# Patient Record
Sex: MALE | Race: WHITE | Employment: FULL TIME | URBAN - NONMETROPOLITAN AREA
[De-identification: names, ages, dates, MRNs, and addresses within clinical notes are randomized per-mention and may not be internally consistent; named-entity substitution may affect disease eponyms.]

---
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
## 2021-08-03 ENCOUNTER — HOSPITAL ENCOUNTER (OUTPATIENT)
Dept: PSYCHIATRY | Age: 21
Setting detail: THERAPIES SERIES
Discharge: HOME | End: 2021-08-03
Payer: COMMERCIAL

## 2021-08-03 PROCEDURE — 90791 PSYCH DIAGNOSTIC EVALUATION: CPT

## 2021-08-03 PROCEDURE — 80305 DRUG TEST PRSMV DIR OPT OBS: CPT

## 2021-08-03 ASSESSMENT — ANXIETY QUESTIONNAIRES
IF YOU CHECKED OFF ANY PROBLEMS ON THIS QUESTIONNAIRE, HOW DIFFICULT HAVE THESE PROBLEMS MADE IT FOR YOU TO DO YOUR WORK, TAKE CARE OF THINGS AT HOME, OR GET ALONG WITH OTHER PEOPLE: SOMEWHAT DIFFICULT
2. NOT BEING ABLE TO STOP OR CONTROL WORRYING: 1
1. FEELING NERVOUS, ANXIOUS, OR ON EDGE: 1
4. TROUBLE RELAXING: 0
5. BEING SO RESTLESS THAT IT IS HARD TO SIT STILL: 0
7. FEELING AFRAID AS IF SOMETHING AWFUL MIGHT HAPPEN: 1
6. BECOMING EASILY ANNOYED OR IRRITABLE: 2
GAD7 TOTAL SCORE: 6
3. WORRYING TOO MUCH ABOUT DIFFERENT THINGS: 1

## 2021-08-03 ASSESSMENT — LIFESTYLE VARIABLES: HISTORY_ALCOHOL_USE: NO

## 2021-08-03 ASSESSMENT — PATIENT HEALTH QUESTIONNAIRE - PHQ9: SUM OF ALL RESPONSES TO PHQ QUESTIONS 1-9: 6

## 2021-08-03 NOTE — PROGRESS NOTES
[facility]                     CLINICAL DIAGNOSIS SUMMARY    Location: [] [place] [] [facility]                   Patient Name: Sabina Delgado   : 2000     Case # :  Craig Sparkle  Therapist: Marita Minor LPC      1. Identifying information:  Sabina Delgado / 2000         [address] works FT from home, resides with his mother and has friends    2. Substance use history:  F12.20 Cannabis dependence-unspecified use       Age 18-current, daily use smoking flower, and sometimes wax. 3. Consequences of substance use: (personal, inter-personal, legal, occupational, medical, nutritional,       Leisure, spiritual, etc.)              2019 and [address], [email or web address] and then at [facility], stopped for a head light 2 weeks ago, 1 year Probation    4. Co-existing problems;  (mental health, psychiatric, previous treatment programs, family       Problems, social, educational, etc.)      Treatment for suicide ideation in  guidance,  ADHD- PCP Adderral Dx since 6 years, father abuses alcohol    5. Treatment needs, barriers to treatment, impact of disease on life:  none    Summary of Medical History:  Prior to Admission medications    Medication Sig Start Date End Date Taking? Authorizing Provider   Albuterol Sulfate 108 (90 BASE) MCG/ACT AEPB Inhale 2 puffs into the lungs every 4 hours as needed   Yes Historical Provider, MD   ibuprofen (ADVIL;MOTRIN) 400 MG tablet Take 1 tablet by mouth every 6 hours as needed for Pain 10/1/15  Yes Milena Lim MD   lisdexamfetamine (VYVANSE) 70 MG capsule Take 70 mg by mouth every morning    Historical Provider, MD   ondansetron (ZOFRAN) 4 MG tablet Take 1 tablet by mouth every 8 hours as needed for Nausea 10/1/15   Milena Lim MD   risperiDONE (RISPERDAL) 1 MG tablet Take 1 mg by mouth 2 times daily. Historical Provider, MD     History reviewed. No pertinent surgical history.   Past Medical History:   Diagnosis Date    ADHD (attention deficit hyperactivity disorder)     Asthma      There are no problems to display for this patient. 6. Level of Care Determination:      1 Outpatient Services   7. Treatment available      __x__yes   _____no         8.   Name of program referred:    ___x_[facility],    ____[facility],       _______other/identify     Electronically signed by Jeremy La LPC on 8/3/2021 at 11:32 AM

## 2021-08-03 NOTE — PROGRESS NOTES
[address] [place] [address] Urinalysis Laboratory Testing and Medical History      Location: [] [place] [x] Faith Mcnamara MD., [id number], Medical Director of [facility] Director orders for [address] [place] [address] clinical therapists to collect an urine sample from:    Client: Bhavana Armenta   : 2000   Case# [id number]    Urine sample will be collected following the collections guidelines provided on Clinical [facility] [place]) custody form, and completion of the [facility] Non-Federal chain of custody drug screening form. During the course of treatment, randomly a urine sample will be collected, at a minimum of one time a month, more frequently as needed, as part of the clinical outpatient alcohol and drug treatment program at [address] [place] [address]. Medical care recommendation for clients experiencing/reporting  medical concerns, who do not have a family physician and willing to attend  medical care will be assisted in seeking medical care. Clinical providers will refer clients to local family physicians practices as part of the  clients treatment plan and assist the client in gaining access to an appointment. Release of information will be requested to support the  clients seeking medical care. Summary of Medical History  Prior to Admission medications    Medication Sig Start Date End Date Taking? Authorizing Provider   Albuterol Sulfate 108 (90 BASE) MCG/ACT AEPB Inhale 2 puffs into the lungs every 4 hours as needed   Yes Historical Provider, MD   ibuprofen (ADVIL;MOTRIN) 400 MG tablet Take 1 tablet by mouth every 6 hours as needed for Pain 10/1/15  Yes Oren Randall MD   lisdexamfetamine (VYVANSE) 70 MG capsule Take 70 mg by mouth every morning    Historical Provider, MD   ondansetron (ZOFRAN) 4 MG tablet Take 1 tablet by mouth every 8 hours as needed for Nausea 10/1/15   Oren Randall MD   risperiDONE (RISPERDAL) 1 MG tablet Take 1 mg by mouth 2 times daily.     Historical ProviderMD Torres reviewed. No pertinent surgical history. Past Medical History:   Diagnosis Date    ADHD (attention deficit hyperactivity disorder)     Asthma      There are no problems to display for this patient.       Selina Garcia [facility]  8/3/2021/11:04 AM

## 2021-08-10 ENCOUNTER — HOSPITAL ENCOUNTER (OUTPATIENT)
Dept: PSYCHIATRY | Age: 21
Setting detail: THERAPIES SERIES
Discharge: HOME | End: 2021-08-10
Payer: COMMERCIAL

## 2021-08-10 ENCOUNTER — APPOINTMENT (OUTPATIENT)
Dept: PSYCHIATRY | Age: 21
End: 2021-08-10
Payer: COMMERCIAL

## 2021-08-10 PROCEDURE — 90834 PSYTX W PT 45 MINUTES: CPT

## 2021-08-10 NOTE — PROGRESS NOTES
[facility]                Progress Note    [] Dustin  [x] Ellis Kramer                    Patient Name: Janna Valenzuela   : 2000     Case # :  [id number]  Therapist: Netta Duron, [address]        Objective/Service/Time:    IT, 1 Hour, Treatment Planning  S-Client reports, frustrated with his mother, former girlfriend, out of meds and challenge to quit using marijuana. O- full range, initially frustrated, agreed begin GT after he is back on Meds  A- Discussed UDS, treatment planning, discussed simple coping techniques, such as breathing tapping, counting backwards. P- Coping techniques, follow up with medical provider, [employer], and coping.                   Netta Duron MA, LPC, LSW, MAC 08/10/21, 10:35 AM

## 2021-08-10 NOTE — PROGRESS NOTES
[facility] TREATMENT PLAN      Location: [] [place] [x] Melva burton    Treatment plan: Initial    Strengths: Work Ethic    Weakness/Limitations: Legal, use of marijuana    Service/Frequency/Duration: 1 IT and GT Weekly and UDS in 90 days    Diagnosis: F12.20 Cannabis dependence-unspecified use    Level of Care: 1 Outpatient Services    1. Problem: Client had arrest 2019, 2021 Possession and Paraphernalia of marijuana. a. Goal: Maintain sober living in 90 days   b. Objectives:   i. 1) Client to complete a treatment book in 90 days Evaluation Date: 11/10/21Code: C Continue TBD  ii. 2) 1-2 activities weekly in 90 days Evaluation Date:11/10/21 Code: C Continue TBD      2. Problem:Client reports past treatment of ADHD, frustration and impulsivity in relationship and use results of .  a. Goal: Develop a coping plan in 90 days  b. Objectives:   i. 1) *Follow up with Medical providers in 90 days Evaluation Date: 11/10/21 Code: C Continue TBD   ii. 2) Learn 4 coping skills to manage stress and frustration in 90 days  Evaluation Date: 11/10/21 Code: C Continue TBD       3. Problem: Legal issues arrest in [facility]. Cty 2019 and 2021 Possession and Paraphernalia of  Marijuana  a. Goal: Meet expectations of [employer] [address] in 90 days  b. Objectives:   i. 1) Attend meetings and pay sanctions in 90 days Evaluation Date: 11/10/21 Code: C Continue TBD      Defer: Develop career goals    Discharge Plan/Instructions: Client enjoys working in customer service. Client strives to complete all sanctions and be off probation. Client maintain substance free. Jenifer Rowe / 2000 has participated in the treatment plan development outlined above on 8/10/2021.      Kermit Knox [facility]  8/10/2021/8:18 AM

## 2021-08-17 ENCOUNTER — HOSPITAL ENCOUNTER (OUTPATIENT)
Dept: PSYCHIATRY | Age: 21
Setting detail: THERAPIES SERIES
Discharge: HOME | End: 2021-08-17
Payer: COMMERCIAL

## 2021-08-17 PROCEDURE — 90834 PSYTX W PT 45 MINUTES: CPT

## 2021-08-17 NOTE — PROGRESS NOTES
[facility]                Progress Note    [] Dustin  [x] Melva burton                    Patient Name: Soni Dawson   : 2000     Case # : [id number]  Therapist: Sidney Alejandra        Objective/Service/Time:    1 Hour,  Ind Counseling, coping skills 0/2-7, Helicopter skills, Anger triggers and coping  S- Client reports, feeling ill called off of work, and continues to smoke marijuana. Client anxious about GT and has a PCP this week. O- Client's response to work, life and treatment- reluctant about GT and not using, open about past relationships including his father. A- Client and this writer discussed coping strategies, Helicopter view, anger triggers/coping    P- Follow up with Medical Provider discuss his worry and response to life with marijuana, begin GT-    , use coping skills, use [employer] pass.                   Jennifer Pickard MA, JOSE M, LSW, MAC 21, 9:45 AM

## 2021-08-24 ENCOUNTER — HOSPITAL ENCOUNTER (OUTPATIENT)
Dept: PSYCHIATRY | Age: 21
Setting detail: THERAPIES SERIES
Discharge: HOME | End: 2021-08-24
Payer: COMMERCIAL

## 2021-08-24 NOTE — PROGRESS NOTES
[facility]                Progress Note    [] Dustin  [x] Melva burton                    Patient Name: Colten Mohr   : 2000     Case # :  [id number]  Therapist: Massimo Mcknight, [address]         Objective/Service/Time:    IT, LVM stating he was ill at 958 am called LVM prompted telehealth and return call   K- 10 Min CM to ill for telehealth,   Agreed to IT and GT .               Massimo Mcknight MA, LPC, LSW, MAC 21, 9:46 AM

## 2021-08-31 ENCOUNTER — APPOINTMENT (OUTPATIENT)
Dept: PSYCHIATRY | Age: 21
End: 2021-08-31
Payer: COMMERCIAL

## 2021-09-02 ENCOUNTER — HOSPITAL ENCOUNTER (OUTPATIENT)
Dept: PSYCHIATRY | Age: 21
Setting detail: THERAPIES SERIES
Discharge: HOME | End: 2021-09-02
Payer: COMMERCIAL

## 2021-09-02 PROCEDURE — 90853 GROUP PSYCHOTHERAPY: CPT

## 2021-09-02 PROCEDURE — 90834 PSYTX W PT 45 MINUTES: CPT

## 2021-09-02 NOTE — GROUP NOTE
[facility] Group Therapy Note      9/2/2021    Location:  [employer]    Clients Presents: [phone]    Clients Absent: [id number]    Length of session: 1.5 hours    Group Note: OP    Group Type: Co-Ed    New members were welcomed and introduced. Norms and expectations of group were discussed. Content: Client's celebrated completion of GT, Client's discussed past triggers and coping skills,  Client's learned and discussed Transformational Steps from the [facility] Recovery story    Susie [facility]  9/2/2021 11:28 AM    Co-Therapist: N/A      [facility] Individual Group Progress Note    Gissel Miranda  2000  9/2/2021    Notes on Client Progress in Group    Client initial GT, Client shared and verbalized emotions and responded to [facility] story. Client cooperated and accepted feedback.      Susie [facility]  9/2/2021 11:33 AM    Co-Therapist: N/A

## 2021-09-02 NOTE — PROGRESS NOTES
[facility]                Progress Note    [] Dustin  [x] Melva burton                    Patient Name: Aye Armstrong   : 2000     Case # :  [id number]  Therapist: Chuck Baxter [facility]        Objective/Service/Time:     1 Hour,  IT,  Coping skills, ,   Cravings  S- Client reports,  Frustration with mother, and work. Angry at father's alcoholism, not wanting to give up smoking marijuana  O- Frustration, coping, anger  A- Client defused, discussed and processed.  Anger issues  P- GT beginning find other methods                      Chuck Baxter MA, LPC, LSW, [facility] 21, 9:16 AM

## 2021-09-07 ENCOUNTER — APPOINTMENT (OUTPATIENT)
Dept: PSYCHIATRY | Age: 21
End: 2021-09-07
Payer: COMMERCIAL

## 2021-09-09 ENCOUNTER — HOSPITAL ENCOUNTER (OUTPATIENT)
Dept: PSYCHIATRY | Age: 21
Setting detail: THERAPIES SERIES
Discharge: HOME | End: 2021-09-09
Payer: COMMERCIAL

## 2021-09-09 PROCEDURE — 90853 GROUP PSYCHOTHERAPY: CPT

## 2021-09-09 PROCEDURE — 90834 PSYTX W PT 45 MINUTES: CPT

## 2021-09-09 NOTE — PROGRESS NOTES
[facility]                Progress Note    [] Dustin  [x] Darlys Buerger                    Patient Name: Stacy Kim   : 2000     Case # :  [id number]  Therapist: Susan Ramos, [facility]        Objective/Service/Time:    IT, 1 Hour, coping with sober living   S-Client reports, frustration and loneliness. Client not working 2 days and concerned financial stressors. Client concerned 2 of his friends are dating behind his back (his former girlfriend)  O- Frustration but attentive and seeking out solutions  A- Discussed surrender use take a week at a time. Client considering other jobs, discussed options for employment and connecting with others. P-GT, IT, explore jobs, use skills taught in [address] Ramos, MA, LPC, LSW, MAC 21, 8:54 AM

## 2021-09-09 NOTE — GROUP NOTE
612 [facility] Group Therapy Note      9/9/2021    Location:  [facility]    Clients Presents: [phone]    Clients Absent:    Length of session: 1.5 hours    Group Note: OP    Group Type: Co-Ed    New members were welcomed and introduced. Norms and expectations of group were discussed. Content: Client's discussed the coping tool, Helicopter view and using strengths. Clients discussed Christy Andrews recovery story. Susan Ramos, [address]  9/9/2021 11:29 AM    Co-Therapist: N/A      [facility] Individual Group Progress Note    Stacy Kim  2000  9/9/2021    Notes on Client Progress in Group    Client openly about his frustrations with legal problems, and impact his alcoholic father had on him. Client applied the tools from Joaquin Wright, cbt and RADHA Smiley Story.     Susan Ramos, [address]  9/9/2021 11:34 AM    Co-Therapist: N/A

## 2021-09-16 ENCOUNTER — HOSPITAL ENCOUNTER (OUTPATIENT)
Dept: PSYCHIATRY | Age: 21
Setting detail: THERAPIES SERIES
Discharge: HOME | End: 2021-09-16
Payer: COMMERCIAL

## 2021-09-16 PROCEDURE — 90834 PSYTX W PT 45 MINUTES: CPT

## 2021-09-16 PROCEDURE — 90853 GROUP PSYCHOTHERAPY: CPT

## 2021-09-16 NOTE — GROUP NOTE
612 [facility] Group Therapy Note      9/16/2021    Location:  [employer]    Clients Presents: [phone]    Clients Absent:    Length of session: 1.5 hours    Group Note: OP    Group Type: Co-Ed    New members were welcomed and introduced. Norms and expectations of group were discussed. Content: Client's watch/introduce to smart recovery tips, Client discussed values exploration packet from therapist aid, Client's viewed and discussed Father Aliza Cerrato Step 1    Gaviota Graves [facility]  9/16/2021 12:06 PM    Co-Therapist: N/A      [facility] Individual Group Progress Note    Tee Heredia  2000  9/16/2021    Notes on Client Progress in Group    Client contemplating stop using weed client related to parts of Father Gamal's Step 1. Client shared about his role of supporting family and how he would to angry at his father who is addicted to Alcohol.      Gaviota Graves [facility]  9/16/2021 12:12 PM    Co-Therapist: N/A

## 2021-09-23 ENCOUNTER — HOSPITAL ENCOUNTER (OUTPATIENT)
Dept: PSYCHIATRY | Age: 21
Setting detail: THERAPIES SERIES
Discharge: HOME | End: 2021-09-23
Payer: COMMERCIAL

## 2021-09-23 PROCEDURE — 90832 PSYTX W PT 30 MINUTES: CPT

## 2021-09-23 NOTE — GROUP NOTE
612 [facility] Group Therapy Note      9/23/2021    Location:  [employer]    Clients Presents: [phone]    Clients Absent: [id number]    Length of session: 1.5 hours    Group Note: OP    Group Type: Co-Ed    New members were welcomed and introduced. Norms and expectations of group were discussed. Content: Client's discussed Addiction/disease handout, viewed and discussed RUDY Workman and DVD on Medical aspects     [facility]  9/23/2021 12:03 PM    Co-Therapist: N/A      [facility] Individual Group Progress Note    Paul Curran  2000  9/23/2021    Notes on Client Progress in Group    Reason for Absence: Cxl, see IT telehealth, ill, testing for COVID    [facility]  9/23/2021 12:09 PM    Co-Therapist: N/A

## 2021-09-23 NOTE — PROGRESS NOTES
612 [facility]                Progress Note    [] Dustin  [x] Abigail Mckeon                    Patient Name: Bruna Fuller   : 2000     Case # :  [id number]  [facility]        Objective/Service/Time:    Telehealth 1.0   Topic:  sober health,  Coping tools, clients ill    The Client stated their name and soc #. Client agreed to [employer] and reported in a Confidential setting. Client and counselor will call back if there is a disconnect. Client has the crisis # for  needs. S-Client reports, he is ill getting tested today for COVID, his family is ill. Client looking for work, door dashing. Client sober 5 days and is getting better. O- ill, focused, and motivated  A- Client discussed his motivation and response to life stressors. Client bored with hobby and still frustrated. Example, drawing if he is not perfect a trigger. P- IT, GT, hobbies, sober friends.                   Jeremy La MA, JOSE M, LSW, MAC 21, 9:04 AM

## 2021-09-30 ENCOUNTER — HOSPITAL ENCOUNTER (OUTPATIENT)
Dept: PSYCHIATRY | Age: 21
Setting detail: THERAPIES SERIES
Discharge: HOME | End: 2021-09-30
Payer: COMMERCIAL

## 2021-09-30 PROCEDURE — 90853 GROUP PSYCHOTHERAPY: CPT

## 2021-09-30 PROCEDURE — 90834 PSYTX W PT 45 MINUTES: CPT

## 2021-09-30 NOTE — PROGRESS NOTES
[facility]                Progress Note    [] Dustin  [x] Melva burton                    Patient Name: Nathaly Segundo   : 2000     Case # : Aarti Mcclure  Therapist: Kelli Ramos, [facility]        Objective/Service/Time:    IT, 45 minutes, UDS 35 Continue  Power of Self talk  S- Client reports late difficulty sleeping. Client continues to work at [employer] in [place]. Currently stopped customer services.   O- Apologetic, tired, cooperative, bad, worried due to using Marijuana use    A- Continued treatment of self talk booklet, and processed sleep/anxious issues  P- GT, IT, report to PO, working out                  Kelli Ramos MA, [facility], ALAINA, Peggy 21, 9:05 AM

## 2021-09-30 NOTE — GROUP NOTE
[address] [facility] Group Therapy Note      9/30/2021    Location:  [facility]    Clients Presents: [phone]    Clients Absent    Length of session: 1.5 hours    Group Note: OP    Group Type: Co-Ed    New members were welcomed and introduced. Norms and expectations of group were discussed. Content: Client's were affirmed by receiving a Gas Card, Client's were focused on protective factors, unhealthy vs healthy thinking and discussing the DTerrie Slaughter story. 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 Client on meds for ADHD and admitted using marijuana.      Christine Lane [facility]  9/30/2021 11:33 AM    Co-Therapist: N/A

## 2021-10-07 ENCOUNTER — HOSPITAL ENCOUNTER (OUTPATIENT)
Dept: PSYCHIATRY | Age: 21
Setting detail: THERAPIES SERIES
Discharge: HOME | End: 2021-10-07
Payer: COMMERCIAL

## 2021-10-07 PROCEDURE — 90834 PSYTX W PT 45 MINUTES: CPT

## 2021-10-07 PROCEDURE — 90853 GROUP PSYCHOTHERAPY: CPT

## 2021-10-07 NOTE — PROGRESS NOTES
612 [facility] TREATMENT PLAN      Location: [] [place] [x] Melva burton    Treatment plan: Initial    Strengths: Work Ethic    Weakness/Limitations: Legal, use of marijuana    Service/Frequency/Duration: 1 IT and GT Weekly and UDS in 90 days    Diagnosis: F12.20 Cannabis dependence-unspecified use    Level of Care: 1 Outpatient Services    1. Problem: Client had arrest 2019, 2021 Possession and Paraphernalia of marijuana. a. Goal: Maintain sober living in 90 days   b. Objectives:   i. 1) Client to complete a treatment book in 90 days Evaluation Date: 11/10/21Code: C Continue TBD  ii. 2) 1-2 activities weekly in 90 days Evaluation Date:11/10/21 Code: C Continue TBD achieved, 10/07/21 working out running    2. Problem:Client reports past treatment of ADHD, frustration and impulsivity in relationship and use results of .  a. Goal: Develop a coping plan in 90 days  b. Objectives:   i. 1) *Follow up with Medical providers in 90 days Evaluation Date: 11/10/21 Code: C Continue TBD   ii. 2) Learn 4 coping skills to manage stress and frustration in 90 days  Evaluation Date: 11/10/21 Code: C Continue TBD Helicopter view, STOPP GT achieved        3. Problem: Legal issues arrest in [facility]. Cty 2019 and 2021 Possession and Paraphernalia of  Marijuana  a. Goal: Meet expectations of [place] [address] in 90 days  b. Objectives:   i. 1) Attend meetings and pay sanctions in 90 days Evaluation Date: 11/10/21 Code: C Continue TBD non reporting 10/07/21      Defer: Develop career goals    Discharge Plan/Instructions: Client enjoys working in customer service. Client strives to complete all sanctions and be off probation. Client maintain substance free. Delvis Dewitt / 2000 has participated in the treatment plan development outlined above on 10/7/2021.      Gisela Kelley [facility]  10/7/2021/9:09 AM

## 2021-10-07 NOTE — PROGRESS NOTES
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 O- client open to positive feedback, shared freely  A- Client and this writer discussed Positive self talk, maintaining sober living and challenging himself.   P- Goal setting, using positive self talk, GT, IT career exploration                  Jasen Ramirez MA, LPC, LSW, MAC 10/07/21, 9:13 AM

## 2021-10-07 NOTE — GROUP NOTE
612 [facility] Group Therapy Note      10/7/2021    Location:  [employer]    Clients Presents: [phone]    Clients Absent:     Length of session: 1.5 hours    Group Note: OP    Group Type: Co-Ed    New members were welcomed and introduced. Norms and expectations of group were discussed. Content: Client's discussed the Process of Recovery, Begin with the end in mind, finding your sober purpose & discussed Gulshan Tgyle sober story. Leta Meadows [facility]  10/7/2021 11:27 AM    Co-Therapist: N/A      [facility] Individual Group Progress Note    Lorna Do  2000  10/7/2021    Notes on Client Progress in Group    Client discussed finding his purpose and connections with his pet dogs, working out, making money with door dash. Client shared frustration with authority figures and how he felt misunderstood.     Leta Meadows [facility]  10/7/2021 11:32 AM    Co-Therapist: N/A

## 2021-10-14 ENCOUNTER — HOSPITAL ENCOUNTER (OUTPATIENT)
Dept: PSYCHIATRY | Age: 21
Setting detail: THERAPIES SERIES
Discharge: HOME | End: 2021-10-14
Payer: COMMERCIAL

## 2021-10-14 PROCEDURE — 90853 GROUP PSYCHOTHERAPY: CPT

## 2021-10-14 PROCEDURE — 90834 PSYTX W PT 45 MINUTES: CPT

## 2021-10-14 NOTE — GROUP NOTE
[facility] Group Therapy Note      10/14/2021    Location:  [employer]    Clients Presents: [phone]    Clients Absent:[phone]    Length of session: 1.5 hours    Group Note: OP    Group Type: Co-Ed    New members were welcomed and introduced. Norms and expectations of group were discussed. Content: Client's discussed the cognitive triangle tool, the wise mind tool, serenity prayer. Client viewed wedgies tools and discussed Step 3 lecture, Father Robbin Bonilla. Charles Maurice [facility]  10/14/2021 11:32 AM    Co-Therapist: N/A      [facility] Individual Group Progress Note    Osvaldo Guerrier  2000  10/14/2021    Notes on Client Progress in Group    Client responded to the Tools, specifically the [address] cited examples of immature responses. Client responded to Father Genevieve Hopkins from an agnostic perspective. Client accepted feedback and is cognizant of other resources, Smart Recovery and Ring of Life. Client using working out and his pets for support.     Charles Maurice [facility]  10/14/2021 11:36 AM    Co-Therapist: N/A

## 2021-10-14 NOTE — PROGRESS NOTES
[facility]                Progress Note    [] Dustin  [x] Sarah Denisjoyce                    Patient Name: Bryson Armendariz   : 2000     Case # :  [id number]  Therapist: Jasen Ramirez [facility]        Objective/Service/Time:    ITT 1 Hour, Topic: exploring jobs -2,  - treatment book on expecting rewards  S- Client talkative about his second interview, supporting his mother, and buying a snake. 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 Client processed feelings of empathy for his mother and excitement about a customer service interview. P- Maintain sober living by attending GT,IT, working and enjoying his pets.               Jasen Ramirez MA, LPC, LSW, MAC 10/14/21, 9:27 AM

## 2021-10-21 ENCOUNTER — HOSPITAL ENCOUNTER (OUTPATIENT)
Dept: PSYCHIATRY | Age: 21
Setting detail: THERAPIES SERIES
Discharge: HOME | End: 2021-10-21
Payer: COMMERCIAL

## 2021-10-21 PROCEDURE — 90834 PSYTX W PT 45 MINUTES: CPT

## 2021-10-21 PROCEDURE — 90853 GROUP PSYCHOTHERAPY: CPT

## 2021-10-21 NOTE — PROGRESS NOTES
[facility]                Progress Note    [] Dustin  [x] Melva burton                    Patient Name: Lashon Garcia   : 2000     Case # : [id number]  Therapist: Bryson Andres 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 O- works out, has pets, cooperative, staying focused  A- reviewed treatment goals completion 21 tentative, Client and this writer continued to discuss wellness and healthy living. Client and this writer discussed seeking relief with drugs and or alcohol  P- Connect with PO, GT, IT, use strengths, exploring work.                   Bryson Andres MA, LPC, LSW, MAC 10/21/21, 9:09 AM

## 2021-10-21 NOTE — GROUP NOTE
612 [facility] Group Therapy Note      10/21/2021    Location:  [employer]    Clients Presents: [phone]    Clients Absent:     Length of session: 1.5 hours    Group Note: OP    Group Type: Co-Ed    New members were welcomed and introduced. Norms and expectations of group were discussed. Content: Client's discussed defense mechanisms their purpose and the negative sides when using to cover emotions and rationalizing addictions. Clients discussed Adam Longo's talk on defense mechanisms,  Discussed coping tools, including yoga. 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 Client actively gave and received feedback about coping and def mechanisms.      Bryson Andres [facility]  10/21/2021 11:33 AM    Co-Therapist: N/A

## 2021-10-21 NOTE — PROGRESS NOTES
612 [facility] TREATMENT PLAN      Location: [] [place] [x] Melva burton    Treatment plan: Initial    Strengths: Work Ethic    Weakness/Limitations: Legal, use of marijuana    Service/Frequency/Duration: 1 IT and GT Weekly and UDS in 90 days    Diagnosis: F12.20 Cannabis dependence-unspecified use    Level of Care: 1 Outpatient Services    1. Problem: Client had arrest 2019, 2021 Possession and Paraphernalia of marijuana. a. Goal: Maintain sober living in 90 days   b. Objectives:   i. 1) Client to complete a treatment book in 90 days Evaluation Date: 11/10/21Code: C Continue TBD  ii. 2) 1-2 activities weekly in 90 days Evaluation Date:11/10/21 Code: C Continue TBD achieved, 10/07/21 working out running    2. Problem:Client reports past treatment of ADHD, frustration and impulsivity in relationship and use results of .  a. Goal: Develop a coping plan in 90 days  b. Objectives:   i. 1) *Follow up with Medical providers in 90 days Evaluation Date: 11/10/21 Code: C Continue TBD   ii. 2) Learn 4 coping skills to manage stress and frustration in 90 days  Evaluation Date: 11/10/21 Code: C Continue TBD Helicopter view, STOPP GT achieved        3. Problem: Legal issues arrest in [facility]. Cty 2019 and 2021 Possession and Paraphernalia of  Marijuana  a. Goal: Meet expectations of [employer] in 90 days  b. Objectives:   i. 1) Attend meetings and pay sanctions in 90 days Evaluation Date: 11/10/21 Code: C Continue TBD non reporting 10/07/21      Defer: Develop career goals    Discharge Plan/Instructions: Client enjoys working in customer service. Client strives to complete all sanctions and be off probation. Client maintain substance free. Bill Huizar / 2000 has participated in the treatment plan development outlined above on 10/21/2021.      Francesco Farley  10/21/2021/9:08 AM

## 2021-10-28 ENCOUNTER — HOSPITAL ENCOUNTER (OUTPATIENT)
Dept: PSYCHIATRY | Age: 21
Setting detail: THERAPIES SERIES
Discharge: HOME | End: 2021-10-28
Payer: COMMERCIAL

## 2021-10-28 PROCEDURE — 90834 PSYTX W PT 45 MINUTES: CPT

## 2021-10-28 NOTE — GROUP NOTE
612 [facility] Group Therapy Note      10/28/2021    Location:  [employer]    Clients Presents: [phone]    Clients Absent: [phone]    Length of session: 1.5 hours    Group Note: OP    Group Type: Co-Ed    New members were welcomed and introduced. Norms and expectations of group were discussed. Content: Client and this writer discussed How to Win the Day, 3 D Coping tool, viewed and discussed to cope and manage triggers & cravings ([address] to Recovery, Triggers)    [facility]  10/28/2021 11:21 AM    Co-Therapist: N/A      [facility] Individual [facility] Progress Note    Malvin Green  2000  10/28/2021    Notes on Client Progress in Group    Reason for Absence: CXL GT due to car would not start, IT Telehealth note, denies using, looking for work.     [facility]  10/28/2021 11:27 AM    Co-Therapist: N/A
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
## 2021-11-04 ENCOUNTER — HOSPITAL ENCOUNTER (OUTPATIENT)
Dept: PSYCHIATRY | Age: 21
Setting detail: THERAPIES SERIES
Discharge: HOME | End: 2021-11-04
Payer: COMMERCIAL

## 2021-11-04 PROCEDURE — 90834 PSYTX W PT 45 MINUTES: CPT

## 2021-11-04 PROCEDURE — 80305 DRUG TEST PRSMV DIR OPT OBS: CPT

## 2021-11-04 PROCEDURE — 90853 GROUP PSYCHOTHERAPY: CPT

## 2021-11-04 NOTE — GROUP NOTE
612 [facility] Group Therapy Note      11/4/2021    Location:  [employer]    Clients Presents: [phone]    Clients Absent: [phone]    Length of session: 1.5 hours    Group Note: OP    Group Type: Co-Ed    New members were welcomed and introduced. Norms and expectations of group were discussed. Content: Discussed 2 Coping THINKING tools, Discussed sober journey with Dr. Damir Mansfield. [facility]  11/4/2021 12:07 PM    Co-Therapist: N/A      [facility]    [facility]  2000  11/4/2021    Notes on Client Progress in Group    Client shared freely about his Uncle's recent relapse. Client participated freely denies used. Client shared about turning 21 and how he does not drink but his mom wants to take him to the bar. Discussed alternatives and setting boundaries.     Abril [facility]  11/4/2021 12:11 PM    Co-Therapist: N/A

## 2021-11-04 NOTE — PROGRESS NOTES
[facility]                Progress Note    [] Dustin  [x] Sarah Noyola                    Patient Name: Chadd Huizar   : 2000     Case # :  [id number]  Therapist: Debi Flannery [facility]        Objective/Service/Time:    K1 Hour, UDS . 35 Topic  Justifying Use  S-Client reports, looking for work, has car challenges, has a bad cold tested negative for COVID. Supporting a friend emotionally. O- Physical Cold, cooperative, engaging. A- Client and this writer processed above and discussed alternatives to Justifying self talk.   P-GT, IT, looking for work, working out, family support                  Alfredo Francois, [place], [place], [address] 21, 9:11 AM

## 2021-11-11 ENCOUNTER — HOSPITAL ENCOUNTER (OUTPATIENT)
Dept: PSYCHIATRY | Age: 21
Setting detail: THERAPIES SERIES
Discharge: HOME | End: 2021-11-11
Payer: COMMERCIAL

## 2021-11-11 PROCEDURE — 90853 GROUP PSYCHOTHERAPY: CPT

## 2021-11-11 PROCEDURE — 90834 PSYTX W PT 45 MINUTES: CPT

## 2021-11-11 PROCEDURE — 80305 DRUG TEST PRSMV DIR OPT OBS: CPT

## 2021-11-11 NOTE — GROUP NOTE
612 [facility] Group Therapy Note      11/11/2021    Location:  [employer]    Clients Presents: [phone]    Clients Absent:     Length of session: 1.5 hours    Group Note: OP    Group Type: Co-Ed    New members were welcomed and introduced. Norms and expectations of group were discussed. Content: Client's discussed motivational tools from [employer] story, Client's discussed Cost/Benefit analysis handout, and discussed [facility] Recovery program story. Yoav Desir [address]  11/11/2021 12:05 PM    Co-Therapist: N/A      [facility] Individual Group Progress Note    Torsten Bethbindzeke  2000  11/11/2021    Notes on Client Progress in Group    Client was eager and talkative. Client responded to both videos and shared & related to recovery in general & his life with motivation.     Yoav Desir [address]  11/11/2021 12:11 PM    Co-Therapist: N/A

## 2021-11-11 NOTE — PROGRESS NOTES
[facility]                Progress Note    [] Dustin  [x] Jean Gary                    Patient Name: Eliud Valentine   : 2000     Case # : [id number]  Therapist: Vincenzo Iniguez [facility]        Objective/Service/Time:    IT  1 Hour Topic:  S- Client reports, using [employer] products, applying for jobs, cleaning and playing with his dog. O- cooperative, surprised about UDS, engaged  A-Discussed [employer] products and positive screen, discussed continual treatment till 2 clean screens.   P- Working, working out, applying for [employer], enjoying pets, and REACH services                  Alfredo Carter [facility], [place], [facility] 21, 9:01 AM

## 2021-11-18 ENCOUNTER — HOSPITAL ENCOUNTER (OUTPATIENT)
Dept: PSYCHIATRY | Age: 21
Setting detail: THERAPIES SERIES
Discharge: HOME | End: 2021-11-18
Payer: COMMERCIAL

## 2021-11-18 PROCEDURE — 90834 PSYTX W PT 45 MINUTES: CPT

## 2021-11-18 PROCEDURE — 90853 GROUP PSYCHOTHERAPY: CPT

## 2021-11-18 NOTE — PROGRESS NOTES
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 Client was discouraged due to money. O- disclosing, open to feedback  A- Client and this writer discussed his options, and coping strategies. Client worked in in [employer] [facility]. P- GT, IT, PO accountability, use coping tools.                   Dona Gr MA, LPC, LSW, MAC 21, 9:05 AM

## 2021-11-18 NOTE — GROUP NOTE
612 [facility] Group Therapy Note      11/18/2021    Location:  [employer]    Clients Presents: [phone]  [phone]    Clients Absent:    Length of session: 1.5 hours    Group Note: OP    Group Type: Co-Ed    New members were welcomed and introduced. Norms and expectations of group were discussed. Content: Client's discussed coping with cravings, holiday planning, and escape planning & how to cope with self talk & conflicts. Kathryn Bah [facility]  11/18/2021 11:37 AM    Co-Therapist: N/A      [facility] Group Progress Note    Amira Bee  2000  11/18/2021    Notes on Client Progress in Group    Client shared a lot about holidays and his plans. Client was attentive, denies use, and looking for work.      Kathryn Bah [facility]  11/18/2021 11:59 AM    Co-Therapist: N/A

## 2021-12-02 ENCOUNTER — HOSPITAL ENCOUNTER (OUTPATIENT)
Dept: PSYCHIATRY | Age: 21
Setting detail: THERAPIES SERIES
Discharge: HOME | End: 2021-12-02
Payer: COMMERCIAL

## 2021-12-02 PROCEDURE — 90853 GROUP PSYCHOTHERAPY: CPT

## 2021-12-02 PROCEDURE — 80305 DRUG TEST PRSMV DIR OPT OBS: CPT

## 2021-12-02 PROCEDURE — 90834 PSYTX W PT 45 MINUTES: CPT

## 2021-12-02 NOTE — GROUP NOTE
612 [facility] Group Therapy Note      12/2/2021    Location:  [employer]    Clients Presents: [phone]     Clients Absent: [phone]    Length of session: 1.5 hours    Group Note: OP    Group Type: Co-Ed    New members were welcomed and introduced. Norms and expectations of group were discussed. Content: Client's discussed stinking thinking vs grateful thinking in relationship to recovery, health and relationships. Clients discussed grateful handouts and how to utilize everyday and with mindfulness. Sarthak Lee Se  12/2/2021 12:33 PM    Co-Therapist: N/A      [facility] Group Progress Note    Ernie Infante  2000  12/2/2021    Notes on Client Progress in Group    Client reports his grandmother taught him gratefulness. Client current stressors finding a job. Client discussed how his pets help him turn his attitude around. Client denies use. Client focused and cooperative during the GT.     Sarthak Lee Se  12/2/2021 12:38 PM    Co-Therapist: N/A
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
## 2021-12-02 NOTE — PROGRESS NOTES
612 [facility] TREATMENT PLAN      Location: [] [place] [x] Hayley Gonzales    Treatment plan: Initial    Strengths: Work Ethic    Weakness/Limitations: Legal, use of marijuana    Service/Frequency/Duration: 1 IT and GT Weekly and UDS in 90 days    Diagnosis: F12.20 Cannabis dependence-unspecified use    Level of Care: 1 Outpatient Services    1. Problem: Client had arrest 2019, 2021 Possession and Paraphernalia of marijuana. a. Goal: Maintain sober living in 90 days   b. Objectives:   i. 1) Client to complete a treatment book in 90 days Evaluation Date: 11/10/21Code: C Continue TBD continue 11/17/21  ii. 2) 1-2 activities weekly in 90 days Evaluation Date:11/10/21 Code: C Continue TBD achieved, 10/07/21 working out running achieved    2. Problem:Client reports past treatment of ADHD, frustration and impulsivity in relationship and use results of .  a. Goal: Develop a coping plan in 90 days  b. Objectives:   i. 1) *Follow up with Medical providers in 90 days Evaluation Date: 11/10/21 Code: C Continue TBD Ongoing 11/17/21  ii. 2) Learn 4 coping skills to manage stress and frustration in 90 days  Evaluation Date: 11/10/21 Code: C Continue TBD Helicopter view, STOPP GT achieved        3. Problem: Legal issues arrest in [facility]. Cty 2019 and 2021 Possession and Paraphernalia of  Marijuana  a. Goal: Meet expectations of [employer] court in 90 days  b. Objectives:   i. 1) Attend meetings and pay sanctions in 90 days Evaluation Date: 11/10/21 Code: C Continue TBD non reporting 10/07/21 achieved      Defer: Develop career goals    Discharge Plan/Instructions: Client enjoys working in customer service. Client strives to complete all sanctions and be off probation. Client maintain substance free. Jo Pierson / 2000 has participated in the treatment plan development outlined above on 12/2/2021.      Kvng [facility]  12/2/2021/8:43 AM

## 2021-12-09 ENCOUNTER — HOSPITAL ENCOUNTER (OUTPATIENT)
Dept: PSYCHIATRY | Age: 21
Setting detail: THERAPIES SERIES
Discharge: HOME | End: 2021-12-09
Payer: COMMERCIAL

## 2021-12-09 NOTE — GROUP NOTE
612 [facility] Group Therapy Note      12/9/2021    Location:  [employer]    Clients Presents: [phone]    Clients Absent: [phone]    Length of session: 1.5 hours    Group Note: OP    Group Type: Co-Ed    New members were welcomed and introduced. Norms and expectations of group were discussed.     Content: Client's and this writer discussed Begin with the End in Mind, Emotional Deposits and [address]; the story of progressive drinking and the impact of abuse on the abuser, their family and workplace (I'll Quit Tomorrow, Vito Call, [address]  12/9/2021 12:02 PM    Co-Therapist: N/A      [facility] Group Progress Note    Ryley Siddiqui  2000  12/9/2021    Notes on Client Progress in Group    Cxl due to illness    Kay Chang, [address]  12/9/2021 12:07 PM    Co-Therapist: N/A

## 2021-12-16 ENCOUNTER — HOSPITAL ENCOUNTER (OUTPATIENT)
Dept: PSYCHIATRY | Age: 21
Setting detail: THERAPIES SERIES
Discharge: HOME | End: 2021-12-16
Payer: COMMERCIAL

## 2021-12-16 ENCOUNTER — APPOINTMENT (OUTPATIENT)
Dept: PSYCHIATRY | Age: 21
End: 2021-12-16
Payer: COMMERCIAL

## 2021-12-16 PROCEDURE — 90853 GROUP PSYCHOTHERAPY: CPT

## 2021-12-16 PROCEDURE — 90834 PSYTX W PT 45 MINUTES: CPT
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
## 2021-12-16 NOTE — GROUP NOTE
612 [facility] Group Therapy Note      12/16/2021    Location:  [facility] [place]    Clients [id number] [phone]    Clients Absent:     Length of session: 1.5 hours    Group Note: OP    Group Type: Co-Ed    New members were welcomed and introduced. Norms and expectations of group were discussed. Content: Client's discussed response to Win The Day, and reframing the day,  and client's discussed big Pharm, dangers of abuse of drugs. The consequences of abuse    [facility]  12/16/2021 11:30 AM    Co-Therapist: N/A      [facility] Group Progress Note    Delvis Dewitt  2000  12/16/2021    Notes on Client Progress in Group  Client participated actively, looking for work, focused, cooperative, denies use.     [facility]  12/16/2021 11:38 AM    Co-Therapist: N/A

## 2021-12-23 ENCOUNTER — APPOINTMENT (OUTPATIENT)
Dept: PSYCHIATRY | Age: 21
End: 2021-12-23
Payer: COMMERCIAL

## 2021-12-30 ENCOUNTER — APPOINTMENT (OUTPATIENT)
Dept: PSYCHIATRY | Age: 21
End: 2021-12-30
Payer: COMMERCIAL

## 2021-12-30 ENCOUNTER — HOSPITAL ENCOUNTER (OUTPATIENT)
Dept: PSYCHIATRY | Age: 21
Setting detail: THERAPIES SERIES
Discharge: HOME | End: 2021-12-30
Payer: COMMERCIAL

## 2021-12-30 PROCEDURE — 90834 PSYTX W PT 45 MINUTES: CPT

## 2021-12-30 PROCEDURE — 90853 GROUP PSYCHOTHERAPY: CPT

## 2021-12-30 PROCEDURE — 80305 DRUG TEST PRSMV DIR OPT OBS: CPT

## 2021-12-30 NOTE — GROUP NOTE
612 [facility] Group Therapy Note      12/30/2021    Location:  [employer]    Clients Presents: [phone]    Clients Absent:     Length of session: 1.5 hours    Group Note: OP    Group Type: Co-Ed    New members were welcomed and introduced. Norms and expectations of group were discussed. Content: Client's discussed protective factors, motivation to maintain sober living, sober living model and celebrated with a peer who completed treatment. [facility] [place]  12/30/2021 12:03 PM    Co-Therapist: N/A      [facility] Group Progress Note    Estephanie Freire  2000  12/30/2021    Notes on Client Progress in Group  Client woke up at 4 am very tired, looking for work, denies use, states his Protective Factors- social support is strong. Client looking for work.     [facility] [place]  12/30/2021 12:10 PM    Co-Therapist: N/A
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
## 2022-01-06 ENCOUNTER — HOSPITAL ENCOUNTER (OUTPATIENT)
Dept: PSYCHIATRY | Age: 22
Setting detail: THERAPIES SERIES
Discharge: HOME | End: 2022-01-06
Payer: COMMERCIAL

## 2022-01-06 ENCOUNTER — APPOINTMENT (OUTPATIENT)
Dept: PSYCHIATRY | Age: 22
End: 2022-01-06
Payer: COMMERCIAL

## 2022-01-06 PROCEDURE — 90834 PSYTX W PT 45 MINUTES: CPT

## 2022-01-06 PROCEDURE — 90853 GROUP PSYCHOTHERAPY: CPT

## 2022-01-06 NOTE — GROUP NOTE
612 [facility] Group Therapy Note      1/6/2022    Location:  [employer]    Clients Presents: [phone]    Clients Absent:     Length of session: 1.5 hours    Group Note: OP    Group Type: Co-Ed    New members were welcomed and introduced. Norms and expectations of group were discussed. Content: Client's discussed 12 steps/virtues and Serenity Prayer; Client's discussed resilience of Virgen Carter & viewed her story. Rissa Webster [facility]  1/6/2022 12:17 PM    Co-Therapist: N/A      [facility] Progress Note    Ian Vasquez  2000  1/6/2022    Notes on Client Progress in Group    Client shared about his father's drinking, discussed looking for work and spoke to the virtue of discipline. Client actively engaged in the discussion.      Rissa Webster [facility]  1/6/2022 12:21 PM    Co-Therapist: N/A
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
## 2022-01-13 ENCOUNTER — APPOINTMENT (OUTPATIENT)
Dept: PSYCHIATRY | Age: 22
End: 2022-01-13
Payer: COMMERCIAL

## 2022-01-13 ENCOUNTER — HOSPITAL ENCOUNTER (OUTPATIENT)
Dept: PSYCHIATRY | Age: 22
Setting detail: THERAPIES SERIES
Discharge: HOME | End: 2022-01-13
Payer: COMMERCIAL

## 2022-01-13 PROCEDURE — 90832 PSYTX W PT 30 MINUTES: CPT

## 2022-01-13 NOTE — GROUP NOTE
612 [facility] Group Therapy Note      1/13/2022    Location:  [facility]    Clients Presents: [phone]    Clients Absent: [phone]    Length of session: 1.5 hours    Group Note: OP    Group Type: Co-Ed    New members were welcomed and introduced. Norms and expectations of group were discussed.     Content: Stinking thinking vs Healthy Thinking,  group cancelled only 1 participant see IT on [address] [place] [address]  1/13/2022 10:18 AM    Co-Therapist: N/A      [facility] Progress Note    Mary Koch  2000  1/13/2022    Notes on Client Progress in Group    Reason for Absence:[employer] car problems called to engage in IT [facility]    Everton Rivers [place]  1/13/2022 10:22 AM    Co-Therapist: N/A

## 2022-01-20 ENCOUNTER — HOSPITAL ENCOUNTER (OUTPATIENT)
Dept: PSYCHIATRY | Age: 22
Setting detail: THERAPIES SERIES
Discharge: HOME | End: 2022-01-20
Payer: COMMERCIAL

## 2022-01-20 ENCOUNTER — APPOINTMENT (OUTPATIENT)
Dept: PSYCHIATRY | Age: 22
End: 2022-01-20
Payer: COMMERCIAL

## 2022-01-20 PROCEDURE — 90853 GROUP PSYCHOTHERAPY: CPT

## 2022-01-20 PROCEDURE — 80305 DRUG TEST PRSMV DIR OPT OBS: CPT

## 2022-01-20 PROCEDURE — 90834 PSYTX W PT 45 MINUTES: CPT

## 2022-01-20 NOTE — GROUP NOTE
612 [facility] Group Therapy Note      1/20/2022    Location:  [facility]    Clients Presents: [phone]    Clients Absent:     Length of session: 1.5 hours    Group Note: OP    Group Type: Co-Ed    New members were welcomed and introduced. Norms and expectations of group were discussed. Content: Client's learned and discussed 7 Habits of Kermit Oh and viewed illustrations, Client's shared in reference life stressors and sober living. 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 Client shared times he was frustrated and responded & set boundaries.     amber [facility]  1/20/2022 12:17 PM    Co-Therapist: N/A
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
## 2022-01-27 ENCOUNTER — HOSPITAL ENCOUNTER (OUTPATIENT)
Dept: PSYCHIATRY | Age: 22
Setting detail: THERAPIES SERIES
Discharge: HOME | End: 2022-01-27
Payer: COMMERCIAL

## 2022-01-27 ENCOUNTER — APPOINTMENT (OUTPATIENT)
Dept: PSYCHIATRY | Age: 22
End: 2022-01-27
Payer: COMMERCIAL

## 2022-01-27 PROCEDURE — 80305 DRUG TEST PRSMV DIR OPT OBS: CPT

## 2022-01-27 PROCEDURE — 90834 PSYTX W PT 45 MINUTES: CPT

## 2022-01-27 PROCEDURE — 90853 GROUP PSYCHOTHERAPY: CPT
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
## 2022-01-27 NOTE — GROUP NOTE
612 [facility] Group Therapy Note      2022    Location:  [place]    Clients Presents: [phone]    Clients Absent:     Length of session: 1.5 hours    Group Note: OP    Group Type: Co-Ed    New members were welcomed and introduced. Norms and expectations of group were discussed. Content: Client discussion Jarocho Wheeler motivational speech, the Disease of the liver, liver disease viewed and discussed Dr. Jonathon Nissen presentation, and celebration of completing GT 5045 member    [facility]  2022 11:21 AM    Co-Therapist: N/A      [facility] Individual Group Progress Note    Raghavendra Pill  2000  2022    Notes on Client Progress in Group    Client tired, coping with relationship stressors, Client looking for work. Client cooperative, responded to treatment questions, discussing the liver. Client shared his father's friend  of Liver problems. Client denies use.     [facility]  2022 11:27 AM    Co-Therapist: N/A

## 2022-02-03 ENCOUNTER — HOSPITAL ENCOUNTER (OUTPATIENT)
Dept: PSYCHIATRY | Age: 22
Setting detail: THERAPIES SERIES
Discharge: HOME | End: 2022-02-03
Payer: COMMERCIAL

## 2022-02-03 ENCOUNTER — APPOINTMENT (OUTPATIENT)
Dept: PSYCHIATRY | Age: 22
End: 2022-02-03
Payer: COMMERCIAL

## 2022-02-08 NOTE — GROUP NOTE
612 [facility] Group Therapy Note      2/8/2022    Location:  [facility]    Clients Presents:     Clients Absent: cancelled by clinic    Length of session: Cancelled due to weather    Group Note: OP    Group Type: Co-Ed    New members were welcomed and introduced. Norms and expectations of group were discussed.     Content: Cancelled by clinic due to weather    [facility]  2/8/2022 8:32 AM    Co-Therapist: N/A      [facility] Progress Note    Maribell Dixon  2000  2/8/2022    Notes on Client Progress in Group    Cancelled by the clinic    [facility]  2/8/2022 8:34 AM    Co-Therapist: N/A

## 2022-02-10 ENCOUNTER — APPOINTMENT (OUTPATIENT)
Dept: PSYCHIATRY | Age: 22
End: 2022-02-10
Payer: COMMERCIAL

## 2022-02-10 ENCOUNTER — HOSPITAL ENCOUNTER (OUTPATIENT)
Dept: PSYCHIATRY | Age: 22
Setting detail: THERAPIES SERIES
Discharge: HOME | End: 2022-02-10
Payer: COMMERCIAL

## 2022-02-10 PROCEDURE — 80305 DRUG TEST PRSMV DIR OPT OBS: CPT

## 2022-02-10 PROCEDURE — 90834 PSYTX W PT 45 MINUTES: CPT

## 2022-02-10 PROCEDURE — 90853 GROUP PSYCHOTHERAPY: CPT
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
## 2022-02-10 NOTE — GROUP NOTE
612 [facility] Group Therapy Note      2/10/2022    Location:  [employer]    Clients Presents: [phone]    Clients Absent:     Length of session: 1.5 hours    Group Note: OP    Group Type: Co-Ed    New members were welcomed and introduced. Norms and expectations of group were discussed. Content: Client's discussed defense mechanisms related to AOD abuse, SmartRecovery Disarm tool and the B. Farve addiction story and sober living. [facility]  2/10/2022 11:23 AM    Co-Therapist: N/A      [facility] Individual [facility] Progress Note    Diamante Newell  2000  2/10/2022    Notes on Client Progress in Group    Client shared his story and maintaining sober living working out, Client has an interview today at [facility]. Client will have final GT next week based on UDS today.       [facility]  2/10/2022 11:27 AM    Co-Therapist: N/A

## 2022-02-17 ENCOUNTER — APPOINTMENT (OUTPATIENT)
Dept: PSYCHIATRY | Age: 22
End: 2022-02-17
Payer: COMMERCIAL

## 2022-02-24 ENCOUNTER — APPOINTMENT (OUTPATIENT)
Dept: PSYCHIATRY | Age: 22
End: 2022-02-24
Payer: COMMERCIAL

## 2022-02-24 ENCOUNTER — HOSPITAL ENCOUNTER (OUTPATIENT)
Dept: PSYCHIATRY | Age: 22
Setting detail: THERAPIES SERIES
Discharge: HOME | End: 2022-02-24
Payer: COMMERCIAL

## 2022-02-24 PROCEDURE — 90834 PSYTX W PT 45 MINUTES: CPT

## 2022-02-24 NOTE — PROGRESS NOTES
612 [facility] Discharge Treatment Plan    Makenzie Kelley  2000  Case # [id number]    Location: [] [place] [x] Melo Nair    A. Stresses that I need to monitor  1. financial  2. job  3. relationship       B. Major triggers to using alcohol/drugs   1. stress        Sober Plan   1. Online Apps  2. Pets   3. Working out    C. Sobriety Support   1. Friend:Dayo  2. Treatment staff: David Bledsoe  3. Family member: Mom and Dad  4. AA/NA recovery program, sponsor, meetings day/times, daily ready: daily Apps, inspiration    D. Non-using activities  1. Video games   2. Working out      E. Consequences of Drug/Alcohol use  1. PV      G. Short term goals to achieve  1. Save money  2. New car, [address] Follow up recommendations  1. Use supports  2. Job exploration    Makenzie Kelley / 2000 has participated in the discharge treatment plan development outlined above on 2/24/2022.      Mandie Turcios [facility]  2/24/2022/6:03 PM

## 2022-02-24 NOTE — PROGRESS NOTES
[facility]                Progress Note    [] Dustin  [x] Kingsley Vazquez                    Patient Name: Bree Espinoza   : 2000     Case # : [id number]  Therapist: Girma Alejandro [facility]        Objective/Service/Time:    IT 1.0 Topic: DC Planning  S- Client and this writer discussed closure. Client motivated and ready for discharge.  Client is working fulltime  O- Client engaged, cooperative  Jacinta Phelps, Discharge Survey, Discharge treatment pLan  P- Follow Discharge treatment plan                  Girma Alejandro, [address] [place] [address] Anette Mansfield, [facility], [facility], [facility] 22, 5:50 PM

## 2022-04-08 ENCOUNTER — OFFICE VISIT (OUTPATIENT)
Dept: FAMILY MEDICINE CLINIC | Age: 22
End: 2022-04-08
Payer: COMMERCIAL

## 2022-04-08 VITALS
HEART RATE: 84 BPM | WEIGHT: 262 LBS | RESPIRATION RATE: 18 BRPM | TEMPERATURE: 97.7 F | DIASTOLIC BLOOD PRESSURE: 60 MMHG | SYSTOLIC BLOOD PRESSURE: 114 MMHG | OXYGEN SATURATION: 97 %

## 2022-04-08 DIAGNOSIS — E66.09 CLASS 2 OBESITY DUE TO EXCESS CALORIES WITHOUT SERIOUS COMORBIDITY WITH BODY MASS INDEX (BMI) OF 38.0 TO 38.9 IN ADULT: ICD-10-CM

## 2022-04-08 DIAGNOSIS — F90.9 ATTENTION DEFICIT HYPERACTIVITY DISORDER (ADHD), UNSPECIFIED ADHD TYPE: Primary | ICD-10-CM

## 2022-04-08 PROCEDURE — 99204 OFFICE O/P NEW MOD 45 MIN: CPT | Performed by: FAMILY MEDICINE

## 2022-04-08 ASSESSMENT — PATIENT HEALTH QUESTIONNAIRE - PHQ9
1. LITTLE INTEREST OR PLEASURE IN DOING THINGS: 0
SUM OF ALL RESPONSES TO PHQ QUESTIONS 1-9: 0
SUM OF ALL RESPONSES TO PHQ QUESTIONS 1-9: 0
SUM OF ALL RESPONSES TO PHQ9 QUESTIONS 1 & 2: 0
SUM OF ALL RESPONSES TO PHQ QUESTIONS 1-9: 0
SUM OF ALL RESPONSES TO PHQ QUESTIONS 1-9: 0
2. FEELING DOWN, DEPRESSED OR HOPELESS: 0

## 2022-04-08 NOTE — LETTER
[facility] [address] [place] [address]  Phone: [phone]  Fax: [phone]    Gustavo Dick MD        April 8, 2022     Patient: Sameera Rockwell   YOB: 2000   Date of Visit: 4/8/2022       To Whom it May Concern:    Sameera Rockwell was seen in my clinic on 4/8/2022. He may return to work on 04/11/2022. If you have any questions or concerns, please don't hesitate to call.     Sincerely,         Gustavo Dick MD

## 2022-04-08 NOTE — PROGRESS NOTES
[facility]  [address] [place] [address]  Dept: [phone]  Loc: [phone]        ASSESSMENT/PLAN:    1. Attention deficit hyperactivity disorder (ADHD), unspecified ADHD type  -     [address] Peter Bhat Dari[place], CNP, [address]  - Patient reports a history of ADHD and has been on Adderall since he was a child. Patient denies ever leaving out of state. Patient is requesting renewal of the Adderall. Patient was referred to psychiatry/behavioral health  - PDMP reviewed, initial prescription of vyvanse was in 01/07/2022 1 and last prescription was on 01/17/2022    2. Class 2 obesity due to excess calories without serious comorbidity with body mass index (BMI) of 38.0 to 38.9 in adult  Affecting all levels of care. Patient was advised to increase exercise and reduce caloric intake. Return if symptoms worsen or fail to improve. Patient's Name: Bhavana Armenta   YOB: 2000   Age: 24 y.o. Date of service: 4/8/2022    Chief Complaint:   Chief Complaint   Patient presents with    New Patient     questions on medications Adderal .  Wanting to loose weight and would like some informatio or plan to help him        Patient ID: Bhavana Armenta is a 24 y.o. male. Chief Complaint   Patient presents with    New Patient     questions on medications Adderal .  Wanting to loose weight and would like some informatio or plan to help him       HPI    Patient is a 51-year-old male who presents to the office for excessive weight gain and attention deficit. Patient reports that he just had a new job and he sometimes lost focus. Patient reports that he has a history of ADHD and has been on Adderall since he was a child. Additionally, patient reports excessive weight gain. Patient reports that he does watch what he eats and exercise but he does not seem to lost much weight.   Patient was asking for advice. 12 point review of systems were negative other than in the HPI     Physical Exam  General: alert, awake, and oriented to time, place, person, and situation. Not in acute distress   Ear, nose, throat, Head: Normal external ears, pupils are equally round, EOMI, normocephalic/atraumatic  Heart: regular rate and rhythm, no audible murmurs, no audible friction rub  Lungs: clear to auscultation bilaterally, no audible crackles, no audible wheezes, no audible rhonchi    Abdomen: normal bowel sounds, soft abdomen, non-tender, no palpable masses  Extremities: no edema, warm, no cyanosis, no clubbing. Good capillary refill   Peripheral vascular: 2+ pulses symmetric (radial)  Neuro: sensation intact and symmetric  Psych: normal affect, normal thoughts     Patient History:  Past Medical History:   Diagnosis Date    ADHD (attention deficit hyperactivity disorder)     Asthma      History reviewed. No pertinent surgical history. Social History     Socioeconomic History    Marital status: Single     Spouse name: Not on file    Number of children: Not on file    Years of education: Not on file    Highest education level: Not on file   Occupational History    Not on file   Tobacco Use    Smoking status: Passive Smoke Exposure - Never Smoker    Smokeless tobacco: Not on file   Substance and Sexual Activity    Alcohol use: No    Drug use: Yes     Types: Marijuana Amanda Holt)    Sexual activity: Yes     Partners: Female   Other Topics Concern    Not on file   Social History Narrative    Not on file     Social Determinants of Health     Financial Resource Strain:     Difficulty of Paying Living Expenses: Not on file   Food Insecurity:     Worried About Running Out of Food in the Last Year: Not on file    Jonathan of Food in the Last Year: Not on file   Transportation Needs:     Lack of Transportation (Medical): Not on file    Lack of Transportation (Non-Medical):  Not on file   Physical Activity:     Days of Exercise per Week: Not on file    Minutes of Exercise per Session: Not on file   Stress:     Feeling of Stress : Not on file   Social Connections:     Frequency of Communication with Friends and Family: Not on file    Frequency of Social Gatherings with Friends and Family: Not on file    Attends Catholic Services: Not on file    Active Member of [address] [place] [address] [employer] or Organizations: Not on file    Attends Club or Organization Meetings: Not on file    Marital Status: Not on file   Intimate Partner Violence:     Fear of Current or Ex-Partner: Not on file    Emotionally Abused: Not on file    Physically Abused: Not on file    Sexually Abused: Not on file   Housing Stability:     Unable to Pay for Housing in the Last Year: Not on file    Number of [place] in the Last Year: Not on file    Unstable Housing in the Last Year: Not on file       There is no immunization history on file for this patient.   No Known Allergies  Family History   Problem Relation Age of Onset    No Known Problems Mother     Alcohol Abuse Father     Heart Disease Father     No Known Problems Sister     No Known Problems Brother     No Known Problems Maternal Aunt     No Known Problems Maternal Uncle     No Known Problems Paternal Aunt     No Known Problems Paternal Uncle     No Known Problems Maternal Grandmother     No Known Problems Maternal Grandfather     No Known Problems Paternal Grandmother     No Known Problems Paternal Grandfather     No Known Problems Maternal Cousin     No Known Problems Paternal Cousin          Current Outpatient Medications   Medication Sig Dispense Refill    Albuterol Sulfate 108 (90 BASE) MCG/ACT AEPB Inhale 2 puffs into the lungs every 4 hours as needed      ibuprofen (ADVIL;MOTRIN) 400 MG tablet Take 1 tablet by mouth every 6 hours as needed for Pain 30 tablet 3    ondansetron (ZOFRAN) 4 MG tablet Take 1 tablet by mouth every 8 hours as needed for Nausea 10 tablet 0    lisdexamfetamine (VYVANSE) 70 MG capsule Take 70 mg by mouth every morning (Patient not taking: Reported on 4/8/2022)       No current facility-administered medications for this visit. Objective:  Vitals:  Vitals:    04/08/22 1053   BP: 114/60   Pulse: 84   Resp: 18   Temp: 97.7 °F (36.5 °C)   SpO2: 97%      BP Readings from Last 4 Encounters:   04/08/22 114/60      There is no height or weight on file to calculate BMI. All questions answered to patient's satisfaction. The patient was counseled regarding impressions, instructions for management and importance of compliance with treatment. Return if symptoms worsen or fail to improve.     David Solis MD

## 2022-07-21 ENCOUNTER — OFFICE VISIT (OUTPATIENT)
Dept: FAMILY MEDICINE CLINIC | Age: 22
End: 2022-07-21
Payer: COMMERCIAL

## 2022-07-21 VITALS
DIASTOLIC BLOOD PRESSURE: 62 MMHG | TEMPERATURE: 97.7 F | BODY MASS INDEX: 38.45 KG/M2 | SYSTOLIC BLOOD PRESSURE: 110 MMHG | WEIGHT: 259.6 LBS | RESPIRATION RATE: 15 BRPM | HEART RATE: 90 BPM | OXYGEN SATURATION: 96 % | HEIGHT: 69 IN

## 2022-07-21 DIAGNOSIS — F41.1 GAD (GENERALIZED ANXIETY DISORDER): ICD-10-CM

## 2022-07-21 DIAGNOSIS — Z76.89 ENCOUNTER FOR PSYCHIATRIC ASSESSMENT: Primary | ICD-10-CM

## 2022-07-21 DIAGNOSIS — F90.2 ATTENTION DEFICIT HYPERACTIVITY DISORDER (ADHD), COMBINED TYPE: ICD-10-CM

## 2022-07-21 DIAGNOSIS — F33.1 MODERATE EPISODE OF RECURRENT MAJOR DEPRESSIVE DISORDER (HCC): ICD-10-CM

## 2022-07-21 PROCEDURE — 80305 DRUG TEST PRSMV DIR OPT OBS: CPT | Performed by: NURSE PRACTITIONER

## 2022-07-21 PROCEDURE — 90792 PSYCH DIAG EVAL W/MED SRVCS: CPT | Performed by: NURSE PRACTITIONER

## 2022-07-21 SDOH — ECONOMIC STABILITY: FOOD INSECURITY: WITHIN THE PAST 12 MONTHS, THE FOOD YOU BOUGHT JUST DIDN'T LAST AND YOU DIDN'T HAVE MONEY TO GET MORE.: NEVER TRUE

## 2022-07-21 SDOH — ECONOMIC STABILITY: HOUSING INSECURITY: IN THE LAST 12 MONTHS, HOW MANY PLACES HAVE YOU LIVED?: 1

## 2022-07-21 SDOH — ECONOMIC STABILITY: FOOD INSECURITY: WITHIN THE PAST 12 MONTHS, YOU WORRIED THAT YOUR FOOD WOULD RUN OUT BEFORE YOU GOT MONEY TO BUY MORE.: NEVER TRUE

## 2022-07-21 SDOH — HEALTH STABILITY: PHYSICAL HEALTH: ON AVERAGE, HOW MANY DAYS PER WEEK DO YOU ENGAGE IN MODERATE TO STRENUOUS EXERCISE (LIKE A BRISK WALK)?: 4 DAYS

## 2022-07-21 SDOH — ECONOMIC STABILITY: HOUSING INSECURITY
IN THE LAST 12 MONTHS, WAS THERE A TIME WHEN YOU DID NOT HAVE A STEADY PLACE TO SLEEP OR SLEPT IN A SHELTER (INCLUDING NOW)?: NO

## 2022-07-21 SDOH — ECONOMIC STABILITY: INCOME INSECURITY: IN THE LAST 12 MONTHS, WAS THERE A TIME WHEN YOU WERE NOT ABLE TO PAY THE MORTGAGE OR RENT ON TIME?: NO

## 2022-07-21 SDOH — ECONOMIC STABILITY: TRANSPORTATION INSECURITY
IN THE PAST 12 MONTHS, HAS LACK OF TRANSPORTATION KEPT YOU FROM MEETINGS, WORK, OR FROM GETTING THINGS NEEDED FOR DAILY LIVING?: NO

## 2022-07-21 SDOH — ECONOMIC STABILITY: TRANSPORTATION INSECURITY
IN THE PAST 12 MONTHS, HAS THE LACK OF TRANSPORTATION KEPT YOU FROM MEDICAL APPOINTMENTS OR FROM GETTING MEDICATIONS?: NO

## 2022-07-21 SDOH — HEALTH STABILITY: PHYSICAL HEALTH: ON AVERAGE, HOW MANY MINUTES DO YOU ENGAGE IN EXERCISE AT THIS LEVEL?: 60 MIN

## 2022-07-21 ASSESSMENT — ANXIETY QUESTIONNAIRES
4. TROUBLE RELAXING: 1
GAD7 TOTAL SCORE: 15
5. BEING SO RESTLESS THAT IT IS HARD TO SIT STILL: 1
1. FEELING NERVOUS, ANXIOUS, OR ON EDGE: 1
3. WORRYING TOO MUCH ABOUT DIFFERENT THINGS: 3
6. BECOMING EASILY ANNOYED OR IRRITABLE: 3
IF YOU CHECKED OFF ANY PROBLEMS ON THIS QUESTIONNAIRE, HOW DIFFICULT HAVE THESE PROBLEMS MADE IT FOR YOU TO DO YOUR WORK, TAKE CARE OF THINGS AT HOME, OR GET ALONG WITH OTHER PEOPLE: SOMEWHAT DIFFICULT
2. NOT BEING ABLE TO STOP OR CONTROL WORRYING: 3
7. FEELING AFRAID AS IF SOMETHING AWFUL MIGHT HAPPEN: 3

## 2022-07-21 ASSESSMENT — PATIENT HEALTH QUESTIONNAIRE - PHQ9
SUM OF ALL RESPONSES TO PHQ QUESTIONS 1-9: 18
5. POOR APPETITE OR OVEREATING: 1
2. FEELING DOWN, DEPRESSED OR HOPELESS: 3
4. FEELING TIRED OR HAVING LITTLE ENERGY: 3
SUM OF ALL RESPONSES TO PHQ QUESTIONS 1-9: 18
SUM OF ALL RESPONSES TO PHQ QUESTIONS 1-9: 18
8. MOVING OR SPEAKING SO SLOWLY THAT OTHER PEOPLE COULD HAVE NOTICED. OR THE OPPOSITE, BEING SO FIGETY OR RESTLESS THAT YOU HAVE BEEN MOVING AROUND A LOT MORE THAN USUAL: 3
7. TROUBLE CONCENTRATING ON THINGS, SUCH AS READING THE NEWSPAPER OR WATCHING TELEVISION: 3
3. TROUBLE FALLING OR STAYING ASLEEP: 1
9. THOUGHTS THAT YOU WOULD BE BETTER OFF DEAD, OR OF HURTING YOURSELF: 0
6. FEELING BAD ABOUT YOURSELF - OR THAT YOU ARE A FAILURE OR HAVE LET YOURSELF OR YOUR FAMILY DOWN: 1
SUM OF ALL RESPONSES TO PHQ9 QUESTIONS 1 & 2: 6
SUM OF ALL RESPONSES TO PHQ QUESTIONS 1-9: 18
10. IF YOU CHECKED OFF ANY PROBLEMS, HOW DIFFICULT HAVE THESE PROBLEMS MADE IT FOR YOU TO DO YOUR WORK, TAKE CARE OF THINGS AT HOME, OR GET ALONG WITH OTHER PEOPLE: 3
1. LITTLE INTEREST OR PLEASURE IN DOING THINGS: 3

## 2022-07-21 ASSESSMENT — SOCIAL DETERMINANTS OF HEALTH (SDOH)
DO YOU BELONG TO ANY CLUBS OR ORGANIZATIONS SUCH AS CHURCH GROUPS UNIONS, FRATERNAL OR ATHLETIC GROUPS, OR SCHOOL GROUPS?: NO
HOW OFTEN DO YOU ATTENT MEETINGS OF THE CLUB OR ORGANIZATION YOU BELONG TO?: NEVER
WITHIN THE LAST YEAR, HAVE YOU BEEN HUMILIATED OR EMOTIONALLY ABUSED IN OTHER WAYS BY YOUR PARTNER OR EX-PARTNER?: NO
HOW HARD IS IT FOR YOU TO PAY FOR THE VERY BASICS LIKE FOOD, HOUSING, MEDICAL CARE, AND HEATING?: NOT HARD AT ALL
HOW OFTEN DO YOU ATTEND CHURCH OR RELIGIOUS SERVICES?: NEVER
WITHIN THE LAST YEAR, HAVE YOU BEEN KICKED, HIT, SLAPPED, OR OTHERWISE PHYSICALLY HURT BY YOUR PARTNER OR EX-PARTNER?: YES
IN A TYPICAL WEEK, HOW MANY TIMES DO YOU TALK ON THE PHONE WITH FAMILY, FRIENDS, OR NEIGHBORS?: TWICE A WEEK
HOW OFTEN DO YOU GET TOGETHER WITH FRIENDS OR RELATIVES?: THREE TIMES A WEEK
WITHIN THE LAST YEAR, HAVE TO BEEN RAPED OR FORCED TO HAVE ANY KIND OF SEXUAL ACTIVITY BY YOUR PARTNER OR EX-PARTNER?: NO
WITHIN THE LAST YEAR, HAVE YOU BEEN AFRAID OF YOUR PARTNER OR EX-PARTNER?: NO
ARE YOU MARRIED, WIDOWED, DIVORCED, SEPARATED, NEVER MARRIED, OR LIVING WITH A PARTNER?: NEVER MARRIED

## 2022-07-21 ASSESSMENT — LIFESTYLE VARIABLES
HOW OFTEN DO YOU HAVE A DRINK CONTAINING ALCOHOL: 2-4 TIMES A MONTH
HOW MANY STANDARD DRINKS CONTAINING ALCOHOL DO YOU HAVE ON A TYPICAL DAY: 1 OR 2
HOW OFTEN DO YOU HAVE A DRINK CONTAINING ALCOHOL: MONTHLY OR LESS
HOW MANY STANDARD DRINKS CONTAINING ALCOHOL DO YOU HAVE ON A TYPICAL DAY: 1 OR 2
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
## 2022-07-22 ENCOUNTER — TELEPHONE (OUTPATIENT)
Dept: FAMILY MEDICINE CLINIC | Age: 22
End: 2022-07-22

## 2022-07-22 RX ORDER — VENLAFAXINE HYDROCHLORIDE 37.5 MG/1
37.5 CAPSULE, EXTENDED RELEASE ORAL DAILY
Qty: 30 CAPSULE | Refills: 1 | Status: SHIPPED | OUTPATIENT
Start: 2022-07-22 | End: 2022-08-22 | Stop reason: SDUPTHER

## 2022-07-22 RX ORDER — DEXTROAMPHETAMINE SACCHARATE, AMPHETAMINE ASPARTATE MONOHYDRATE, DEXTROAMPHETAMINE SULFATE AND AMPHETAMINE SULFATE 2.5; 2.5; 2.5; 2.5 MG/1; MG/1; MG/1; MG/1
10 CAPSULE, EXTENDED RELEASE ORAL DAILY
Qty: 30 CAPSULE | Refills: 0 | Status: SHIPPED | OUTPATIENT
Start: 2022-07-22 | End: 2022-08-22 | Stop reason: SDUPTHER

## 2022-07-25 ASSESSMENT — ENCOUNTER SYMPTOMS
SHORTNESS OF BREATH: 0
WHEEZING: 0
COUGH: 0
CHEST TIGHTNESS: 0

## 2022-08-22 DIAGNOSIS — F33.1 MODERATE EPISODE OF RECURRENT MAJOR DEPRESSIVE DISORDER (HCC): ICD-10-CM

## 2022-08-22 DIAGNOSIS — F41.1 GAD (GENERALIZED ANXIETY DISORDER): ICD-10-CM

## 2022-08-22 DIAGNOSIS — F90.2 ATTENTION DEFICIT HYPERACTIVITY DISORDER (ADHD), COMBINED TYPE: ICD-10-CM

## 2022-08-22 RX ORDER — DEXTROAMPHETAMINE SACCHARATE, AMPHETAMINE ASPARTATE MONOHYDRATE, DEXTROAMPHETAMINE SULFATE AND AMPHETAMINE SULFATE 2.5; 2.5; 2.5; 2.5 MG/1; MG/1; MG/1; MG/1
10 CAPSULE, EXTENDED RELEASE ORAL DAILY
Qty: 31 CAPSULE | Refills: 0 | Status: SHIPPED | OUTPATIENT
Start: 2022-08-22 | End: 2022-09-22 | Stop reason: DRUGHIGH

## 2022-08-22 RX ORDER — VENLAFAXINE HYDROCHLORIDE 37.5 MG/1
37.5 CAPSULE, EXTENDED RELEASE ORAL DAILY
Qty: 31 CAPSULE | Refills: 0 | Status: SHIPPED | OUTPATIENT
Start: 2022-08-22 | End: 2022-09-22 | Stop reason: SDUPTHER

## 2022-09-22 ENCOUNTER — OFFICE VISIT (OUTPATIENT)
Dept: FAMILY MEDICINE CLINIC | Age: 22
End: 2022-09-22
Payer: COMMERCIAL

## 2022-09-22 VITALS
TEMPERATURE: 97.5 F | WEIGHT: 256 LBS | HEART RATE: 92 BPM | HEIGHT: 69 IN | DIASTOLIC BLOOD PRESSURE: 70 MMHG | OXYGEN SATURATION: 97 % | BODY MASS INDEX: 37.92 KG/M2 | SYSTOLIC BLOOD PRESSURE: 111 MMHG

## 2022-09-22 DIAGNOSIS — F90.2 ATTENTION DEFICIT HYPERACTIVITY DISORDER (ADHD), COMBINED TYPE: ICD-10-CM

## 2022-09-22 DIAGNOSIS — F33.1 MODERATE EPISODE OF RECURRENT MAJOR DEPRESSIVE DISORDER (HCC): Primary | ICD-10-CM

## 2022-09-22 DIAGNOSIS — F41.1 GAD (GENERALIZED ANXIETY DISORDER): ICD-10-CM

## 2022-09-22 LAB
ALCOHOL URINE: NORMAL
AMPHETAMINE SCREEN, URINE: NORMAL
BARBITURATE SCREEN, URINE: NORMAL
BENZODIAZEPINE SCREEN, URINE: NORMAL
BUPRENORPHINE URINE: NORMAL
COCAINE METABOLITE SCREEN URINE: NORMAL
FENTANYL SCREEN, URINE: NORMAL
GABAPENTIN SCREEN, URINE: NORMAL
MDMA URINE: NORMAL
METHADONE SCREEN, URINE: NORMAL
METHAMPHETAMINE, URINE: NORMAL
OPIATE SCREEN URINE: NORMAL
OXYCODONE SCREEN URINE: NORMAL
PHENCYCLIDINE SCREEN URINE: NORMAL
PROPOXYPHENE SCREEN, URINE: NORMAL
SYNTHETIC CANNABINOIDS(K2) SCREEN, URINE: NORMAL
THC SCREEN, URINE: POSITIVE
TRAMADOL SCREEN URINE: NORMAL
TRICYCLIC ANTIDEPRESSANTS, UR: NORMAL

## 2022-09-22 PROCEDURE — 99214 OFFICE O/P EST MOD 30 MIN: CPT | Performed by: NURSE PRACTITIONER

## 2022-09-22 PROCEDURE — 80305 DRUG TEST PRSMV DIR OPT OBS: CPT | Performed by: NURSE PRACTITIONER

## 2022-09-22 RX ORDER — DEXTROAMPHETAMINE SACCHARATE, AMPHETAMINE ASPARTATE MONOHYDRATE, DEXTROAMPHETAMINE SULFATE AND AMPHETAMINE SULFATE 3.75; 3.75; 3.75; 3.75 MG/1; MG/1; MG/1; MG/1
15 CAPSULE, EXTENDED RELEASE ORAL DAILY
Qty: 30 CAPSULE | Refills: 0 | Status: SHIPPED | OUTPATIENT
Start: 2022-09-22 | End: 2022-10-20 | Stop reason: SDUPTHER

## 2022-09-22 RX ORDER — VENLAFAXINE HYDROCHLORIDE 37.5 MG/1
37.5 CAPSULE, EXTENDED RELEASE ORAL DAILY
Qty: 31 CAPSULE | Refills: 0 | Status: SHIPPED | OUTPATIENT
Start: 2022-09-22 | End: 2022-10-20 | Stop reason: DRUGHIGH

## 2022-09-22 ASSESSMENT — PATIENT HEALTH QUESTIONNAIRE - PHQ9
7. TROUBLE CONCENTRATING ON THINGS, SUCH AS READING THE NEWSPAPER OR WATCHING TELEVISION: 1
8. MOVING OR SPEAKING SO SLOWLY THAT OTHER PEOPLE COULD HAVE NOTICED. OR THE OPPOSITE, BEING SO FIGETY OR RESTLESS THAT YOU HAVE BEEN MOVING AROUND A LOT MORE THAN USUAL: 0
1. LITTLE INTEREST OR PLEASURE IN DOING THINGS: 0
6. FEELING BAD ABOUT YOURSELF - OR THAT YOU ARE A FAILURE OR HAVE LET YOURSELF OR YOUR FAMILY DOWN: 0
4. FEELING TIRED OR HAVING LITTLE ENERGY: 1
SUM OF ALL RESPONSES TO PHQ QUESTIONS 1-9: 3
SUM OF ALL RESPONSES TO PHQ QUESTIONS 1-9: 3
2. FEELING DOWN, DEPRESSED OR HOPELESS: 1
SUM OF ALL RESPONSES TO PHQ QUESTIONS 1-9: 3
SUM OF ALL RESPONSES TO PHQ9 QUESTIONS 1 & 2: 1
SUM OF ALL RESPONSES TO PHQ QUESTIONS 1-9: 3
5. POOR APPETITE OR OVEREATING: 0
3. TROUBLE FALLING OR STAYING ASLEEP: 0
9. THOUGHTS THAT YOU WOULD BE BETTER OFF DEAD, OR OF HURTING YOURSELF: 0
10. IF YOU CHECKED OFF ANY PROBLEMS, HOW DIFFICULT HAVE THESE PROBLEMS MADE IT FOR YOU TO DO YOUR WORK, TAKE CARE OF THINGS AT HOME, OR GET ALONG WITH OTHER PEOPLE: 0

## 2022-09-22 ASSESSMENT — ANXIETY QUESTIONNAIRES
IF YOU CHECKED OFF ANY PROBLEMS ON THIS QUESTIONNAIRE, HOW DIFFICULT HAVE THESE PROBLEMS MADE IT FOR YOU TO DO YOUR WORK, TAKE CARE OF THINGS AT HOME, OR GET ALONG WITH OTHER PEOPLE: SOMEWHAT DIFFICULT
1. FEELING NERVOUS, ANXIOUS, OR ON EDGE: 0
5. BEING SO RESTLESS THAT IT IS HARD TO SIT STILL: 0
7. FEELING AFRAID AS IF SOMETHING AWFUL MIGHT HAPPEN: 0
6. BECOMING EASILY ANNOYED OR IRRITABLE: 1
3. WORRYING TOO MUCH ABOUT DIFFERENT THINGS: 0
4. TROUBLE RELAXING: 0
GAD7 TOTAL SCORE: 2
2. NOT BEING ABLE TO STOP OR CONTROL WORRYING: 1
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
## 2022-09-26 ASSESSMENT — ENCOUNTER SYMPTOMS
CHEST TIGHTNESS: 0
SHORTNESS OF BREATH: 0
COUGH: 0
WHEEZING: 0

## 2022-10-20 ENCOUNTER — OFFICE VISIT (OUTPATIENT)
Dept: FAMILY MEDICINE CLINIC | Age: 22
End: 2022-10-20
Payer: COMMERCIAL

## 2022-10-20 VITALS
RESPIRATION RATE: 16 BRPM | OXYGEN SATURATION: 98 % | SYSTOLIC BLOOD PRESSURE: 130 MMHG | TEMPERATURE: 97.8 F | BODY MASS INDEX: 38.1 KG/M2 | HEART RATE: 70 BPM | DIASTOLIC BLOOD PRESSURE: 72 MMHG | WEIGHT: 258 LBS

## 2022-10-20 DIAGNOSIS — F33.1 MODERATE EPISODE OF RECURRENT MAJOR DEPRESSIVE DISORDER (HCC): Primary | ICD-10-CM

## 2022-10-20 DIAGNOSIS — F90.2 ATTENTION DEFICIT HYPERACTIVITY DISORDER (ADHD), COMBINED TYPE: ICD-10-CM

## 2022-10-20 DIAGNOSIS — F41.1 GAD (GENERALIZED ANXIETY DISORDER): ICD-10-CM

## 2022-10-20 PROCEDURE — 99214 OFFICE O/P EST MOD 30 MIN: CPT | Performed by: NURSE PRACTITIONER

## 2022-10-20 RX ORDER — VENLAFAXINE HYDROCHLORIDE 75 MG/1
75 CAPSULE, EXTENDED RELEASE ORAL DAILY
Qty: 30 CAPSULE | Refills: 0 | Status: SHIPPED | OUTPATIENT
Start: 2022-10-20

## 2022-10-20 RX ORDER — DEXTROAMPHETAMINE SACCHARATE, AMPHETAMINE ASPARTATE MONOHYDRATE, DEXTROAMPHETAMINE SULFATE AND AMPHETAMINE SULFATE 3.75; 3.75; 3.75; 3.75 MG/1; MG/1; MG/1; MG/1
15 CAPSULE, EXTENDED RELEASE ORAL DAILY
Qty: 30 CAPSULE | Refills: 0 | Status: SHIPPED | OUTPATIENT
Start: 2022-10-20 | End: 2022-11-19

## 2022-10-20 ASSESSMENT — PATIENT HEALTH QUESTIONNAIRE - PHQ9
SUM OF ALL RESPONSES TO PHQ9 QUESTIONS 1 & 2: 4
SUM OF ALL RESPONSES TO PHQ QUESTIONS 1-9: 18
10. IF YOU CHECKED OFF ANY PROBLEMS, HOW DIFFICULT HAVE THESE PROBLEMS MADE IT FOR YOU TO DO YOUR WORK, TAKE CARE OF THINGS AT HOME, OR GET ALONG WITH OTHER PEOPLE: 2
SUM OF ALL RESPONSES TO PHQ QUESTIONS 1-9: 18
8. MOVING OR SPEAKING SO SLOWLY THAT OTHER PEOPLE COULD HAVE NOTICED. OR THE OPPOSITE, BEING SO FIGETY OR RESTLESS THAT YOU HAVE BEEN MOVING AROUND A LOT MORE THAN USUAL: 3
5. POOR APPETITE OR OVEREATING: 3
1. LITTLE INTEREST OR PLEASURE IN DOING THINGS: 3
SUM OF ALL RESPONSES TO PHQ QUESTIONS 1-9: 18
9. THOUGHTS THAT YOU WOULD BE BETTER OFF DEAD, OR OF HURTING YOURSELF: 0
2. FEELING DOWN, DEPRESSED OR HOPELESS: 1
7. TROUBLE CONCENTRATING ON THINGS, SUCH AS READING THE NEWSPAPER OR WATCHING TELEVISION: 3
SUM OF ALL RESPONSES TO PHQ QUESTIONS 1-9: 18
3. TROUBLE FALLING OR STAYING ASLEEP: 2
4. FEELING TIRED OR HAVING LITTLE ENERGY: 2
6. FEELING BAD ABOUT YOURSELF - OR THAT YOU ARE A FAILURE OR HAVE LET YOURSELF OR YOUR FAMILY DOWN: 1

## 2022-10-20 ASSESSMENT — ANXIETY QUESTIONNAIRES
2. NOT BEING ABLE TO STOP OR CONTROL WORRYING: 1
7. FEELING AFRAID AS IF SOMETHING AWFUL MIGHT HAPPEN: 3
5. BEING SO RESTLESS THAT IT IS HARD TO SIT STILL: 1
4. TROUBLE RELAXING: 1
GAD7 TOTAL SCORE: 10
6. BECOMING EASILY ANNOYED OR IRRITABLE: 1
IF YOU CHECKED OFF ANY PROBLEMS ON THIS QUESTIONNAIRE, HOW DIFFICULT HAVE THESE PROBLEMS MADE IT FOR YOU TO DO YOUR WORK, TAKE CARE OF THINGS AT HOME, OR GET ALONG WITH OTHER PEOPLE: SOMEWHAT DIFFICULT
3. WORRYING TOO MUCH ABOUT DIFFERENT THINGS: 3
1. FEELING NERVOUS, ANXIOUS, OR ON EDGE: 0

## 2022-10-20 ASSESSMENT — ENCOUNTER SYMPTOMS
SHORTNESS OF BREATH: 0
WHEEZING: 0
COUGH: 0
CHEST TIGHTNESS: 0
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
## 2022-10-20 NOTE — LETTER
[facility] [address] [place] [address]  Phone: [phone]  Fax: [phone]    AUGUSTIN Villalta NP        October 20, 2022     Patient: Esteban Siddiqui   YOB: 2000   Date of Visit: 10/20/2022       To Whom it May Concern:    Esteban Siddqiui was seen in my clinic on 10/20/2022. He may return to work on 10/21/2022. If you have any questions or concerns, please don't hesitate to call.     Sincerely,         AUGUSTIN Villalta NP

## 2022-11-03 DIAGNOSIS — F41.1 GAD (GENERALIZED ANXIETY DISORDER): ICD-10-CM

## 2022-11-03 DIAGNOSIS — F33.1 MODERATE EPISODE OF RECURRENT MAJOR DEPRESSIVE DISORDER (HCC): ICD-10-CM

## 2022-11-03 RX ORDER — VENLAFAXINE HYDROCHLORIDE 37.5 MG/1
CAPSULE, EXTENDED RELEASE ORAL
Qty: 31 CAPSULE | Refills: 0 | OUTPATIENT
Start: 2022-11-03

## 2022-11-16 DIAGNOSIS — F33.1 MODERATE EPISODE OF RECURRENT MAJOR DEPRESSIVE DISORDER (HCC): ICD-10-CM

## 2022-11-16 DIAGNOSIS — F41.1 GAD (GENERALIZED ANXIETY DISORDER): ICD-10-CM

## 2022-11-17 DIAGNOSIS — F41.1 GAD (GENERALIZED ANXIETY DISORDER): ICD-10-CM

## 2022-11-17 DIAGNOSIS — F33.1 MODERATE EPISODE OF RECURRENT MAJOR DEPRESSIVE DISORDER (HCC): ICD-10-CM

## 2022-11-17 DIAGNOSIS — F90.2 ATTENTION DEFICIT HYPERACTIVITY DISORDER (ADHD), COMBINED TYPE: ICD-10-CM

## 2022-11-17 RX ORDER — VENLAFAXINE HYDROCHLORIDE 75 MG/1
CAPSULE, EXTENDED RELEASE ORAL
Qty: 30 CAPSULE | Refills: 0 | Status: SHIPPED | OUTPATIENT
Start: 2022-11-17 | End: 2022-11-28 | Stop reason: SDUPTHER

## 2022-11-17 RX ORDER — DEXTROAMPHETAMINE SACCHARATE, AMPHETAMINE ASPARTATE MONOHYDRATE, DEXTROAMPHETAMINE SULFATE AND AMPHETAMINE SULFATE 3.75; 3.75; 3.75; 3.75 MG/1; MG/1; MG/1; MG/1
15 CAPSULE, EXTENDED RELEASE ORAL DAILY
Qty: 30 CAPSULE | Refills: 0 | Status: SHIPPED | OUTPATIENT
Start: 2022-11-19 | End: 2022-11-28 | Stop reason: SDUPTHER

## 2022-11-17 RX ORDER — VENLAFAXINE HYDROCHLORIDE 75 MG/1
CAPSULE, EXTENDED RELEASE ORAL
Qty: 30 CAPSULE | Refills: 0 | Status: SHIPPED | OUTPATIENT
Start: 2022-11-17 | End: 2022-11-17 | Stop reason: SDUPTHER

## 2022-11-28 ENCOUNTER — OFFICE VISIT (OUTPATIENT)
Dept: FAMILY MEDICINE CLINIC | Age: 22
End: 2022-11-28
Payer: COMMERCIAL

## 2022-11-28 VITALS
WEIGHT: 257.6 LBS | DIASTOLIC BLOOD PRESSURE: 65 MMHG | SYSTOLIC BLOOD PRESSURE: 114 MMHG | OXYGEN SATURATION: 97 % | BODY MASS INDEX: 38.04 KG/M2 | TEMPERATURE: 97.2 F | HEART RATE: 74 BPM | RESPIRATION RATE: 16 BRPM

## 2022-11-28 DIAGNOSIS — F90.2 ATTENTION DEFICIT HYPERACTIVITY DISORDER (ADHD), COMBINED TYPE: ICD-10-CM

## 2022-11-28 DIAGNOSIS — F33.1 MODERATE EPISODE OF RECURRENT MAJOR DEPRESSIVE DISORDER (HCC): Primary | ICD-10-CM

## 2022-11-28 DIAGNOSIS — R06.83 SNORING: ICD-10-CM

## 2022-11-28 DIAGNOSIS — F41.1 GAD (GENERALIZED ANXIETY DISORDER): ICD-10-CM

## 2022-11-28 DIAGNOSIS — G47.19 EXCESSIVE DAYTIME SLEEPINESS: ICD-10-CM

## 2022-11-28 PROCEDURE — 99214 OFFICE O/P EST MOD 30 MIN: CPT | Performed by: NURSE PRACTITIONER

## 2022-11-28 RX ORDER — VENLAFAXINE HYDROCHLORIDE 75 MG/1
75 CAPSULE, EXTENDED RELEASE ORAL DAILY
Qty: 30 CAPSULE | Refills: 2 | Status: SHIPPED | OUTPATIENT
Start: 2022-11-28 | End: 2022-12-28

## 2022-11-28 RX ORDER — DEXTROAMPHETAMINE SACCHARATE, AMPHETAMINE ASPARTATE MONOHYDRATE, DEXTROAMPHETAMINE SULFATE AND AMPHETAMINE SULFATE 3.75; 3.75; 3.75; 3.75 MG/1; MG/1; MG/1; MG/1
15 CAPSULE, EXTENDED RELEASE ORAL DAILY
Qty: 30 CAPSULE | Refills: 0 | Status: SHIPPED | OUTPATIENT
Start: 2022-11-28 | End: 2022-12-28

## 2022-11-28 RX ORDER — DEXTROAMPHETAMINE SACCHARATE, AMPHETAMINE ASPARTATE MONOHYDRATE, DEXTROAMPHETAMINE SULFATE AND AMPHETAMINE SULFATE 3.75; 3.75; 3.75; 3.75 MG/1; MG/1; MG/1; MG/1
15 CAPSULE, EXTENDED RELEASE ORAL DAILY
Qty: 30 CAPSULE | Refills: 0 | Status: SHIPPED | OUTPATIENT
Start: 2022-12-28 | End: 2023-01-27

## 2022-11-28 RX ORDER — DEXTROAMPHETAMINE SACCHARATE, AMPHETAMINE ASPARTATE MONOHYDRATE, DEXTROAMPHETAMINE SULFATE AND AMPHETAMINE SULFATE 3.75; 3.75; 3.75; 3.75 MG/1; MG/1; MG/1; MG/1
15 CAPSULE, EXTENDED RELEASE ORAL DAILY
Qty: 30 CAPSULE | Refills: 0 | Status: SHIPPED | OUTPATIENT
Start: 2023-01-27 | End: 2023-02-26

## 2022-11-28 RX ORDER — VENLAFAXINE HYDROCHLORIDE 75 MG/1
CAPSULE, EXTENDED RELEASE ORAL
Qty: 30 CAPSULE | Refills: 0 | Status: CANCELLED | OUTPATIENT
Start: 2022-11-28

## 2022-11-28 ASSESSMENT — ANXIETY QUESTIONNAIRES
IF YOU CHECKED OFF ANY PROBLEMS ON THIS QUESTIONNAIRE, HOW DIFFICULT HAVE THESE PROBLEMS MADE IT FOR YOU TO DO YOUR WORK, TAKE CARE OF THINGS AT HOME, OR GET ALONG WITH OTHER PEOPLE: VERY DIFFICULT
3. WORRYING TOO MUCH ABOUT DIFFERENT THINGS: 3
6. BECOMING EASILY ANNOYED OR IRRITABLE: 1
5. BEING SO RESTLESS THAT IT IS HARD TO SIT STILL: 3
4. TROUBLE RELAXING: 1
GAD7 TOTAL SCORE: 13
2. NOT BEING ABLE TO STOP OR CONTROL WORRYING: 1
1. FEELING NERVOUS, ANXIOUS, OR ON EDGE: 1
7. FEELING AFRAID AS IF SOMETHING AWFUL MIGHT HAPPEN: 3

## 2022-11-28 ASSESSMENT — PATIENT HEALTH QUESTIONNAIRE - PHQ9
5. POOR APPETITE OR OVEREATING: 1
SUM OF ALL RESPONSES TO PHQ QUESTIONS 1-9: 9
SUM OF ALL RESPONSES TO PHQ QUESTIONS 1-9: 9
6. FEELING BAD ABOUT YOURSELF - OR THAT YOU ARE A FAILURE OR HAVE LET YOURSELF OR YOUR FAMILY DOWN: 1
9. THOUGHTS THAT YOU WOULD BE BETTER OFF DEAD, OR OF HURTING YOURSELF: 0
3. TROUBLE FALLING OR STAYING ASLEEP: 0
1. LITTLE INTEREST OR PLEASURE IN DOING THINGS: 3
SUM OF ALL RESPONSES TO PHQ QUESTIONS 1-9: 9
7. TROUBLE CONCENTRATING ON THINGS, SUCH AS READING THE NEWSPAPER OR WATCHING TELEVISION: 0
4. FEELING TIRED OR HAVING LITTLE ENERGY: 3
2. FEELING DOWN, DEPRESSED OR HOPELESS: 0
SUM OF ALL RESPONSES TO PHQ9 QUESTIONS 1 & 2: 3
8. MOVING OR SPEAKING SO SLOWLY THAT OTHER PEOPLE COULD HAVE NOTICED. OR THE OPPOSITE, BEING SO FIGETY OR RESTLESS THAT YOU HAVE BEEN MOVING AROUND A LOT MORE THAN USUAL: 1
10. IF YOU CHECKED OFF ANY PROBLEMS, HOW DIFFICULT HAVE THESE PROBLEMS MADE IT FOR YOU TO DO YOUR WORK, TAKE CARE OF THINGS AT HOME, OR GET ALONG WITH OTHER PEOPLE: 2
SUM OF ALL RESPONSES TO PHQ QUESTIONS 1-9: 9
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
## 2022-12-05 ASSESSMENT — ENCOUNTER SYMPTOMS
COUGH: 0
CHEST TIGHTNESS: 0
WHEEZING: 0
SHORTNESS OF BREATH: 0

## 2022-12-09 ENCOUNTER — NURSE ONLY (OUTPATIENT)
Dept: FAMILY MEDICINE CLINIC | Age: 22
End: 2022-12-09

## 2022-12-12 ENCOUNTER — TELEPHONE (OUTPATIENT)
Dept: FAMILY MEDICINE CLINIC | Age: 22
End: 2022-12-12

## 2022-12-12 ENCOUNTER — NURSE ONLY (OUTPATIENT)
Dept: FAMILY MEDICINE CLINIC | Age: 22
End: 2022-12-12
Payer: COMMERCIAL

## 2022-12-12 DIAGNOSIS — F41.1 GAD (GENERALIZED ANXIETY DISORDER): ICD-10-CM

## 2022-12-12 LAB
BASOPHILS ABSOLUTE: 0 K/UL (ref 0–0.2)
BASOPHILS RELATIVE PERCENT: 0.4 %
EOSINOPHILS ABSOLUTE: 0.2 K/UL (ref 0–0.6)
EOSINOPHILS RELATIVE PERCENT: 2.4 %
HCT VFR BLD CALC: 49.9 % (ref 40.5–52.5)
HEMOGLOBIN: 16.3 G/DL (ref 13.5–17.5)
LYMPHOCYTES ABSOLUTE: 1.8 K/UL (ref 1–5.1)
LYMPHOCYTES RELATIVE PERCENT: 23.2 %
MCH RBC QN AUTO: 28 PG (ref 26–34)
MCHC RBC AUTO-ENTMCNC: 32.6 G/DL (ref 31–36)
MCV RBC AUTO: 86 FL (ref 80–100)
MONOCYTES ABSOLUTE: 0.4 K/UL (ref 0–1.3)
MONOCYTES RELATIVE PERCENT: 4.9 %
NEUTROPHILS ABSOLUTE: 5.4 K/UL (ref 1.7–7.7)
NEUTROPHILS RELATIVE PERCENT: 69.1 %
PDW BLD-RTO: 14.1 % (ref 12.4–15.4)
PLATELET # BLD: 298 K/UL (ref 135–450)
PMV BLD AUTO: 7.5 FL (ref 5–10.5)
RBC # BLD: 5.8 M/UL (ref 4.2–5.9)
WBC # BLD: 7.8 K/UL (ref 4–11)

## 2022-12-12 PROCEDURE — 36415 COLL VENOUS BLD VENIPUNCTURE: CPT | Performed by: NURSE PRACTITIONER

## 2022-12-13 LAB
A/G RATIO: 1.5 (ref 1.1–2.2)
ALBUMIN SERPL-MCNC: 4.6 G/DL (ref 3.4–5)
ALP BLD-CCNC: 104 U/L (ref 40–129)
ALT SERPL-CCNC: 24 U/L (ref 10–40)
ANION GAP SERPL CALCULATED.3IONS-SCNC: 14 MMOL/L (ref 3–16)
AST SERPL-CCNC: 20 U/L (ref 15–37)
BILIRUB SERPL-MCNC: 0.3 MG/DL (ref 0–1)
BUN BLDV-MCNC: 16 MG/DL (ref 7–20)
CALCIUM SERPL-MCNC: 10.3 MG/DL (ref 8.3–10.6)
CHLORIDE BLD-SCNC: 101 MMOL/L (ref 99–110)
CO2: 24 MMOL/L (ref 21–32)
CREAT SERPL-MCNC: 0.6 MG/DL (ref 0.9–1.3)
FOLATE: 11.5 NG/ML (ref 4.78–24.2)
GFR SERPL CREATININE-BSD FRML MDRD: >60 ML/MIN/{1.73_M2}
GLUCOSE FASTING: 90 MG/DL (ref 70–99)
POTASSIUM SERPL-SCNC: 4.5 MMOL/L (ref 3.5–5.1)
SODIUM BLD-SCNC: 139 MMOL/L (ref 136–145)
T4 FREE: 1.4 NG/DL (ref 0.9–1.8)
TOTAL PROTEIN: 7.6 G/DL (ref 6.4–8.2)
TSH SERPL DL<=0.05 MIU/L-ACNC: 1.78 UIU/ML (ref 0.27–4.2)
VITAMIN B-12: 947 PG/ML (ref 211–911)
VITAMIN D 25-HYDROXY: 30.5 NG/ML

## 2023-01-31 DIAGNOSIS — F33.1 MODERATE EPISODE OF RECURRENT MAJOR DEPRESSIVE DISORDER (HCC): ICD-10-CM

## 2023-01-31 DIAGNOSIS — F41.1 GAD (GENERALIZED ANXIETY DISORDER): ICD-10-CM

## 2023-01-31 DIAGNOSIS — F90.2 ATTENTION DEFICIT HYPERACTIVITY DISORDER (ADHD), COMBINED TYPE: ICD-10-CM

## 2023-01-31 RX ORDER — DEXTROAMPHETAMINE SACCHARATE, AMPHETAMINE ASPARTATE MONOHYDRATE, DEXTROAMPHETAMINE SULFATE AND AMPHETAMINE SULFATE 3.75; 3.75; 3.75; 3.75 MG/1; MG/1; MG/1; MG/1
15 CAPSULE, EXTENDED RELEASE ORAL DAILY
Qty: 30 CAPSULE | Refills: 0 | Status: CANCELLED | OUTPATIENT
Start: 2023-01-31 | End: 2023-03-02

## 2023-01-31 RX ORDER — VENLAFAXINE HYDROCHLORIDE 75 MG/1
75 CAPSULE, EXTENDED RELEASE ORAL DAILY
Qty: 30 CAPSULE | Refills: 2 | Status: SHIPPED | OUTPATIENT
Start: 2023-01-31 | End: 2023-03-02

## 2023-02-09 DIAGNOSIS — F90.2 ATTENTION DEFICIT HYPERACTIVITY DISORDER (ADHD), COMBINED TYPE: ICD-10-CM

## 2023-02-09 RX ORDER — DEXTROAMPHETAMINE SACCHARATE, AMPHETAMINE ASPARTATE MONOHYDRATE, DEXTROAMPHETAMINE SULFATE AND AMPHETAMINE SULFATE 3.75; 3.75; 3.75; 3.75 MG/1; MG/1; MG/1; MG/1
15 CAPSULE, EXTENDED RELEASE ORAL DAILY
Qty: 30 CAPSULE | Refills: 0 | Status: SHIPPED | OUTPATIENT
Start: 2023-02-09 | End: 2023-03-11

## 2023-03-20 DIAGNOSIS — F33.1 MODERATE EPISODE OF RECURRENT MAJOR DEPRESSIVE DISORDER (HCC): ICD-10-CM

## 2023-03-20 DIAGNOSIS — F90.2 ATTENTION DEFICIT HYPERACTIVITY DISORDER (ADHD), COMBINED TYPE: ICD-10-CM

## 2023-03-20 DIAGNOSIS — F41.1 GAD (GENERALIZED ANXIETY DISORDER): ICD-10-CM

## 2023-03-20 RX ORDER — DEXTROAMPHETAMINE SACCHARATE, AMPHETAMINE ASPARTATE MONOHYDRATE, DEXTROAMPHETAMINE SULFATE AND AMPHETAMINE SULFATE 3.75; 3.75; 3.75; 3.75 MG/1; MG/1; MG/1; MG/1
15 CAPSULE, EXTENDED RELEASE ORAL DAILY
Qty: 30 CAPSULE | Refills: 0 | OUTPATIENT
Start: 2023-03-20 | End: 2023-04-19

## 2023-03-20 RX ORDER — VENLAFAXINE HYDROCHLORIDE 75 MG/1
75 CAPSULE, EXTENDED RELEASE ORAL DAILY
Qty: 30 CAPSULE | Refills: 2 | OUTPATIENT
Start: 2023-03-20 | End: 2023-04-19

## 2023-03-20 NOTE — TELEPHONE ENCOUNTER
I let the patient know Char Powell wouldn't be able to fill his prescriptions until he came in for his appointment. He understood.

## 2023-03-29 ENCOUNTER — OFFICE VISIT (OUTPATIENT)
Dept: FAMILY MEDICINE CLINIC | Age: 23
End: 2023-03-29
Payer: COMMERCIAL

## 2023-03-29 VITALS
TEMPERATURE: 97.3 F | HEART RATE: 75 BPM | RESPIRATION RATE: 16 BRPM | WEIGHT: 261.4 LBS | SYSTOLIC BLOOD PRESSURE: 126 MMHG | DIASTOLIC BLOOD PRESSURE: 61 MMHG | BODY MASS INDEX: 38.6 KG/M2 | OXYGEN SATURATION: 97 %

## 2023-03-29 DIAGNOSIS — F33.1 MODERATE EPISODE OF RECURRENT MAJOR DEPRESSIVE DISORDER (HCC): Primary | ICD-10-CM

## 2023-03-29 DIAGNOSIS — F90.2 ATTENTION DEFICIT HYPERACTIVITY DISORDER (ADHD), COMBINED TYPE: ICD-10-CM

## 2023-03-29 DIAGNOSIS — E66.09 CLASS 2 OBESITY DUE TO EXCESS CALORIES WITHOUT SERIOUS COMORBIDITY WITH BODY MASS INDEX (BMI) OF 38.0 TO 38.9 IN ADULT: ICD-10-CM

## 2023-03-29 DIAGNOSIS — Z51.81 MEDICATION MONITORING ENCOUNTER: ICD-10-CM

## 2023-03-29 DIAGNOSIS — F41.1 GAD (GENERALIZED ANXIETY DISORDER): ICD-10-CM

## 2023-03-29 LAB
ALCOHOL URINE: NORMAL
AMPHETAMINE SCREEN, URINE: NEGATIVE
BARBITURATE SCREEN, URINE: NEGATIVE
BENZODIAZEPINE SCREEN, URINE: NEGATIVE
BUPRENORPHINE URINE: NEGATIVE
COCAINE METABOLITE SCREEN URINE: NEGATIVE
FENTANYL SCREEN, URINE: NEGATIVE
GABAPENTIN SCREEN, URINE: NEGATIVE
MDMA URINE: NEGATIVE
METHADONE SCREEN, URINE: NEGATIVE
METHAMPHETAMINE, URINE: NEGATIVE
OPIATE SCREEN URINE: NEGATIVE
OXYCODONE SCREEN URINE: NEGATIVE
PHENCYCLIDINE SCREEN URINE: NEGATIVE
PROPOXYPHENE SCREEN, URINE: NEGATIVE
SYNTHETIC CANNABINOIDS(K2) SCREEN, URINE: NEGATIVE
THC SCREEN, URINE: POSITIVE
TRAMADOL SCREEN URINE: NEGATIVE
TRICYCLIC ANTIDEPRESSANTS, UR: NEGATIVE

## 2023-03-29 PROCEDURE — 80305 DRUG TEST PRSMV DIR OPT OBS: CPT | Performed by: NURSE PRACTITIONER

## 2023-03-29 PROCEDURE — 99214 OFFICE O/P EST MOD 30 MIN: CPT | Performed by: NURSE PRACTITIONER

## 2023-03-29 RX ORDER — DEXTROAMPHETAMINE SACCHARATE, AMPHETAMINE ASPARTATE MONOHYDRATE, DEXTROAMPHETAMINE SULFATE AND AMPHETAMINE SULFATE 3.75; 3.75; 3.75; 3.75 MG/1; MG/1; MG/1; MG/1
15 CAPSULE, EXTENDED RELEASE ORAL DAILY
Qty: 30 CAPSULE | Refills: 0 | Status: SHIPPED | OUTPATIENT
Start: 2023-04-28 | End: 2023-05-28

## 2023-03-29 RX ORDER — DEXTROAMPHETAMINE SACCHARATE, AMPHETAMINE ASPARTATE MONOHYDRATE, DEXTROAMPHETAMINE SULFATE AND AMPHETAMINE SULFATE 3.75; 3.75; 3.75; 3.75 MG/1; MG/1; MG/1; MG/1
15 CAPSULE, EXTENDED RELEASE ORAL DAILY
Qty: 30 CAPSULE | Refills: 0 | Status: CANCELLED | OUTPATIENT
Start: 2023-03-29 | End: 2023-04-28

## 2023-03-29 RX ORDER — VENLAFAXINE HYDROCHLORIDE 150 MG/1
150 CAPSULE, EXTENDED RELEASE ORAL DAILY
Qty: 30 CAPSULE | Refills: 1 | Status: SHIPPED | OUTPATIENT
Start: 2023-03-29

## 2023-03-29 RX ORDER — DEXTROAMPHETAMINE SACCHARATE, AMPHETAMINE ASPARTATE MONOHYDRATE, DEXTROAMPHETAMINE SULFATE AND AMPHETAMINE SULFATE 3.75; 3.75; 3.75; 3.75 MG/1; MG/1; MG/1; MG/1
15 CAPSULE, EXTENDED RELEASE ORAL DAILY
Qty: 30 CAPSULE | Refills: 0 | Status: SHIPPED | OUTPATIENT
Start: 2023-03-29 | End: 2023-04-28

## 2023-03-29 ASSESSMENT — PATIENT HEALTH QUESTIONNAIRE - PHQ9
SUM OF ALL RESPONSES TO PHQ QUESTIONS 1-9: 17
SUM OF ALL RESPONSES TO PHQ QUESTIONS 1-9: 17
9. THOUGHTS THAT YOU WOULD BE BETTER OFF DEAD, OR OF HURTING YOURSELF: 0
10. IF YOU CHECKED OFF ANY PROBLEMS, HOW DIFFICULT HAVE THESE PROBLEMS MADE IT FOR YOU TO DO YOUR WORK, TAKE CARE OF THINGS AT HOME, OR GET ALONG WITH OTHER PEOPLE: 2
3. TROUBLE FALLING OR STAYING ASLEEP: 3
2. FEELING DOWN, DEPRESSED OR HOPELESS: 1
1. LITTLE INTEREST OR PLEASURE IN DOING THINGS: 0
SUM OF ALL RESPONSES TO PHQ9 QUESTIONS 1 & 2: 1
SUM OF ALL RESPONSES TO PHQ QUESTIONS 1-9: 17
4. FEELING TIRED OR HAVING LITTLE ENERGY: 3
8. MOVING OR SPEAKING SO SLOWLY THAT OTHER PEOPLE COULD HAVE NOTICED. OR THE OPPOSITE, BEING SO FIGETY OR RESTLESS THAT YOU HAVE BEEN MOVING AROUND A LOT MORE THAN USUAL: 3
SUM OF ALL RESPONSES TO PHQ QUESTIONS 1-9: 17
5. POOR APPETITE OR OVEREATING: 3
6. FEELING BAD ABOUT YOURSELF - OR THAT YOU ARE A FAILURE OR HAVE LET YOURSELF OR YOUR FAMILY DOWN: 2
7. TROUBLE CONCENTRATING ON THINGS, SUCH AS READING THE NEWSPAPER OR WATCHING TELEVISION: 2

## 2023-03-29 ASSESSMENT — ENCOUNTER SYMPTOMS
WHEEZING: 0
CHEST TIGHTNESS: 0
SHORTNESS OF BREATH: 0
COUGH: 0

## 2023-03-29 NOTE — PROGRESS NOTES
Skin is warm and dry. Capillary Refill: Capillary refill takes less than 2 seconds. Neurological:      Mental Status: He is alert and oriented to person, place, and time. Assessment / Plan:      1. Moderate episode of recurrent major depressive disorder (HCC)  Will increase Effexor to 150 mg daily to target symptoms of anxiety and depression. Patient to call if any concerns or SI before their follow up appointment. If he develops suicidal thoughts with a plan, he is instructed to go to emergency room immediately. Behavioral counseling recommended. - venlafaxine (EFFEXOR XR) 150 MG extended release capsule; Take 1 capsule by mouth daily  Dispense: 30 capsule; Refill: 1    2. JAG (generalized anxiety disorder)  Behavioral counseling recommended. - venlafaxine (EFFEXOR XR) 150 MG extended release capsule; Take 1 capsule by mouth daily  Dispense: 30 capsule; Refill: 1    3. Attention deficit hyperactivity disorder (ADHD), combined type  Medication refilled. Discussion held with the patient on importance of coming to all scheduled appointments. Due to the nature of the medication, it will not be continued if he continues to miss appointments. - amphetamine-dextroamphetamine (ADDERALL XR) 15 MG extended release capsule; Take 1 capsule by mouth daily for 30 days. Dispense: 30 capsule; Refill: 0  - amphetamine-dextroamphetamine (ADDERALL XR) 15 MG extended release capsule; Take 1 capsule by mouth daily for 30 days. Dispense: 30 capsule; Refill: 0    4. Medication monitoring encounter  - POCT Rapid Drug Screen    5. Class 2 obesity due to excess calories without serious comorbidity with body mass index (BMI) of 38.0 to 38.9 in adult  Continue efforts at regular exercise, healthy diet and weight loss. The patient,Jacob Frederica Gitelman,  was seen with a total time spent of 30 minutes for the visit on this date of service by the E/M provider.  The time component had both face to face and non

## 2023-05-09 ENCOUNTER — OFFICE VISIT (OUTPATIENT)
Dept: FAMILY MEDICINE CLINIC | Age: 23
End: 2023-05-09
Payer: COMMERCIAL

## 2023-05-09 VITALS
WEIGHT: 254.4 LBS | RESPIRATION RATE: 16 BRPM | OXYGEN SATURATION: 96 % | HEART RATE: 79 BPM | SYSTOLIC BLOOD PRESSURE: 118 MMHG | DIASTOLIC BLOOD PRESSURE: 66 MMHG | TEMPERATURE: 97.2 F | BODY MASS INDEX: 37.57 KG/M2

## 2023-05-09 DIAGNOSIS — F41.1 GAD (GENERALIZED ANXIETY DISORDER): ICD-10-CM

## 2023-05-09 DIAGNOSIS — F90.2 ATTENTION DEFICIT HYPERACTIVITY DISORDER (ADHD), COMBINED TYPE: ICD-10-CM

## 2023-05-09 DIAGNOSIS — Z51.81 MEDICATION MONITORING ENCOUNTER: ICD-10-CM

## 2023-05-09 DIAGNOSIS — F33.1 MODERATE EPISODE OF RECURRENT MAJOR DEPRESSIVE DISORDER (HCC): Primary | ICD-10-CM

## 2023-05-09 LAB
ALCOHOL URINE: NORMAL
AMPHETAMINE SCREEN, URINE: POSITIVE
BARBITURATE SCREEN, URINE: NEGATIVE
BENZODIAZEPINE SCREEN, URINE: NEGATIVE
BUPRENORPHINE URINE: NEGATIVE
COCAINE METABOLITE SCREEN URINE: NEGATIVE
FENTANYL SCREEN, URINE: NEGATIVE
GABAPENTIN SCREEN, URINE: NEGATIVE
MDMA URINE: NEGATIVE
METHADONE SCREEN, URINE: NEGATIVE
METHAMPHETAMINE, URINE: NEGATIVE
OPIATE SCREEN URINE: NEGATIVE
OXYCODONE SCREEN URINE: NEGATIVE
PHENCYCLIDINE SCREEN URINE: NEGATIVE
PROPOXYPHENE SCREEN, URINE: NEGATIVE
SYNTHETIC CANNABINOIDS(K2) SCREEN, URINE: NEGATIVE
THC SCREEN, URINE: POSITIVE
TRAMADOL SCREEN URINE: NEGATIVE
TRICYCLIC ANTIDEPRESSANTS, UR: NEGATIVE

## 2023-05-09 PROCEDURE — 80305 DRUG TEST PRSMV DIR OPT OBS: CPT | Performed by: NURSE PRACTITIONER

## 2023-05-09 PROCEDURE — 99214 OFFICE O/P EST MOD 30 MIN: CPT | Performed by: NURSE PRACTITIONER

## 2023-05-09 RX ORDER — DEXTROAMPHETAMINE SACCHARATE, AMPHETAMINE ASPARTATE MONOHYDRATE, DEXTROAMPHETAMINE SULFATE AND AMPHETAMINE SULFATE 5; 5; 5; 5 MG/1; MG/1; MG/1; MG/1
20 CAPSULE, EXTENDED RELEASE ORAL EVERY MORNING
Qty: 30 CAPSULE | Refills: 0 | Status: SHIPPED | OUTPATIENT
Start: 2023-05-09 | End: 2023-05-09

## 2023-05-09 RX ORDER — VENLAFAXINE HYDROCHLORIDE 150 MG/1
150 CAPSULE, EXTENDED RELEASE ORAL DAILY
Qty: 30 CAPSULE | Refills: 12 | Status: SHIPPED | OUTPATIENT
Start: 2023-05-09 | End: 2023-06-08

## 2023-05-09 RX ORDER — VENLAFAXINE HYDROCHLORIDE 150 MG/1
150 CAPSULE, EXTENDED RELEASE ORAL DAILY
Qty: 30 CAPSULE | Refills: 12 | Status: SHIPPED | OUTPATIENT
Start: 2023-05-09 | End: 2023-05-09

## 2023-05-09 RX ORDER — DEXTROAMPHETAMINE SACCHARATE, AMPHETAMINE ASPARTATE MONOHYDRATE, DEXTROAMPHETAMINE SULFATE AND AMPHETAMINE SULFATE 5; 5; 5; 5 MG/1; MG/1; MG/1; MG/1
20 CAPSULE, EXTENDED RELEASE ORAL EVERY MORNING
Qty: 30 CAPSULE | Refills: 0 | Status: SHIPPED | OUTPATIENT
Start: 2023-05-09 | End: 2023-06-08

## 2023-05-09 ASSESSMENT — ENCOUNTER SYMPTOMS
SHORTNESS OF BREATH: 0
CHEST TIGHTNESS: 0
WHEEZING: 0
COUGH: 0

## 2023-05-09 NOTE — PATIENT INSTRUCTIONS
Welcome to [facility] [facility]! Did you know we now have a faster way for you to move through your appointment? For your convenience, we now have digital registration available. When you schedule your next appointment, you will receive a link via your e-mail as well as a text message that will allow you to complete any paperwork digitally before your appointment.

## 2023-05-09 NOTE — PROGRESS NOTES
Subjective:      Chief Complaint   Patient presents with    Follow-up     Would like to discuss increasing adderall, is having a little more time focusing       HPI:  Ajit Tian is a 25 y.o. male who presents today for medication follow up. Anxiety/Depression:  He is prescribed Effexor 150 mg daily. Symptoms are well controlled at this time. He denies visual  and auditory hallucinations, delusions, illusions, and paranoia. Pt denies current suicidal ideation, plan and intent. Pt  denies current homicidal ideation, plan and intent. ADD/ADHD:  Current treatment: Adderall XR- 15 mg , which has been somewhat effective. Residual symptoms: n, inability to stay focused on a task and he finds that he is bouncing from one task to the next before completing the first task. Patient denies anorexia, nausea, vomiting, abdominal pain, involuntary weight loss, palpitations, insomnia, irritability, anxiety, headache, and tremor. Per [place], medication last filled 04/28/2023. Past Medical History:   Diagnosis Date    ADHD (attention deficit hyperactivity disorder)     diagnosed at the age of 10    Asthma         Social History     Tobacco Use    Smoking status: Never     Passive exposure: Yes    Smokeless tobacco: Never   Substance Use Topics    Alcohol use: Yes     Comment: occassionally        Review of Systems   Constitutional:  Negative for appetite change, chills, diaphoresis, fatigue, fever and unexpected weight change. Respiratory:  Negative for cough, chest tightness, shortness of breath and wheezing. Cardiovascular:  Negative for chest pain, palpitations and leg swelling. Endocrine: Negative. Negative for cold intolerance, heat intolerance, polydipsia, polyphagia and polyuria. Skin: Negative. Negative for rash. Neurological: Negative. Negative for dizziness, tremors, seizures, syncope, facial asymmetry, speech difficulty, weakness, light-headedness, numbness and headaches.

## 2023-05-11 ENCOUNTER — TELEPHONE (OUTPATIENT)
Dept: FAMILY MEDICINE CLINIC | Age: 23
End: 2023-05-11

## 2023-05-11 DIAGNOSIS — F90.2 ATTENTION DEFICIT HYPERACTIVITY DISORDER (ADHD), COMBINED TYPE: ICD-10-CM

## 2023-05-11 NOTE — TELEPHONE ENCOUNTER
Received PA for [facility]. [facility] because script was supposed to be for generic version. Was told they do not have the generic version in stock due to shortage so they switched to brand. PA submitted and denied by insurance (ref # Damian D. 5/11/2023). Patient notified of above and that he would need to call around to see who has the generic version in stock and let us know. No further action required at this time.

## 2023-05-12 RX ORDER — DEXTROAMPHETAMINE SACCHARATE, AMPHETAMINE ASPARTATE MONOHYDRATE, DEXTROAMPHETAMINE SULFATE AND AMPHETAMINE SULFATE 5; 5; 5; 5 MG/1; MG/1; MG/1; MG/1
20 CAPSULE, EXTENDED RELEASE ORAL EVERY MORNING
Qty: 30 CAPSULE | Refills: 0 | OUTPATIENT
Start: 2023-05-12 | End: 2023-06-11

## 2023-05-16 ENCOUNTER — TELEPHONE (OUTPATIENT)
Dept: FAMILY MEDICINE CLINIC | Age: 23
End: 2023-05-16

## 2023-05-16 DIAGNOSIS — F90.2 ATTENTION DEFICIT HYPERACTIVITY DISORDER (ADHD), COMBINED TYPE: ICD-10-CM

## 2023-05-16 RX ORDER — DEXTROAMPHETAMINE SACCHARATE, AMPHETAMINE ASPARTATE MONOHYDRATE, DEXTROAMPHETAMINE SULFATE AND AMPHETAMINE SULFATE 5; 5; 5; 5 MG/1; MG/1; MG/1; MG/1
20 CAPSULE, EXTENDED RELEASE ORAL EVERY MORNING
Qty: 30 CAPSULE | Refills: 0 | Status: SHIPPED | OUTPATIENT
Start: 2023-05-16 | End: 2023-06-15

## 2023-06-05 ENCOUNTER — OFFICE VISIT (OUTPATIENT)
Dept: FAMILY MEDICINE CLINIC | Age: 23
End: 2023-06-05
Payer: COMMERCIAL

## 2023-06-05 VITALS
TEMPERATURE: 97.8 F | BODY MASS INDEX: 38.04 KG/M2 | WEIGHT: 257.6 LBS | OXYGEN SATURATION: 95 % | HEART RATE: 63 BPM | SYSTOLIC BLOOD PRESSURE: 130 MMHG | RESPIRATION RATE: 16 BRPM | DIASTOLIC BLOOD PRESSURE: 80 MMHG

## 2023-06-05 DIAGNOSIS — F90.2 ATTENTION DEFICIT HYPERACTIVITY DISORDER (ADHD), COMBINED TYPE: Primary | ICD-10-CM

## 2023-06-05 DIAGNOSIS — Z51.81 MEDICATION MONITORING ENCOUNTER: ICD-10-CM

## 2023-06-05 PROCEDURE — 80305 DRUG TEST PRSMV DIR OPT OBS: CPT | Performed by: NURSE PRACTITIONER

## 2023-06-05 PROCEDURE — 99214 OFFICE O/P EST MOD 30 MIN: CPT | Performed by: NURSE PRACTITIONER

## 2023-06-05 RX ORDER — METHYLPHENIDATE HYDROCHLORIDE 18 MG/1
18 TABLET ORAL DAILY
Qty: 30 TABLET | Refills: 0 | Status: SHIPPED | OUTPATIENT
Start: 2023-06-05 | End: 2023-07-05

## 2023-06-05 RX ORDER — VENLAFAXINE HYDROCHLORIDE 150 MG/1
150 CAPSULE, EXTENDED RELEASE ORAL DAILY
Qty: 30 CAPSULE | Refills: 12 | Status: CANCELLED | OUTPATIENT
Start: 2023-06-05 | End: 2023-07-05

## 2023-06-05 ASSESSMENT — ENCOUNTER SYMPTOMS
COUGH: 0
CHEST TIGHTNESS: 0
WHEEZING: 0
SHORTNESS OF BREATH: 0

## 2023-06-05 NOTE — PROGRESS NOTES
Subjective:      Chief Complaint   Patient presents with    Follow-up     Has been out of medication, unable to get from the pharmacy       HPI:  Yuliya Castillo is a 25 y.o. male who presents today for medication follow up. ADD/ADHD:  His Adderall was increased to 20 mg 05/09/23 but he was unable to find a pharmacy that had medication in stock. Since he has not been taking medication he has noted lack of mediation, increased distractibilty and inability to focus and complete tasks. Past Medical History:   Diagnosis Date    ADHD (attention deficit hyperactivity disorder)     diagnosed at the age of 10    Asthma         Social History     Tobacco Use    Smoking status: Never     Passive exposure: Yes    Smokeless tobacco: Never   Substance Use Topics    Alcohol use: Yes     Comment: occassionally        Review of Systems   Constitutional:  Negative for appetite change, chills, diaphoresis, fatigue, fever and unexpected weight change. Respiratory:  Negative for cough, chest tightness, shortness of breath and wheezing. Cardiovascular:  Negative for chest pain, palpitations and leg swelling. Endocrine: Negative. Negative for cold intolerance, heat intolerance, polydipsia, polyphagia and polyuria. Skin: Negative. Negative for rash. Neurological: Negative. Negative for dizziness, tremors, seizures, syncope, facial asymmetry, speech difficulty, weakness, light-headedness, numbness and headaches. Psychiatric/Behavioral:  Positive for decreased concentration. Negative for agitation, behavioral problems, confusion, dysphoric mood, hallucinations, self-injury, sleep disturbance and suicidal ideas. The patient is not nervous/anxious and is not hyperactive. Anxiety/depression well controlled at this time.             Objective:      /80 (Site: Right Upper Arm, Position: Sitting, Cuff Size: Large Adult)   Pulse 63   Temp 97.8 °F (36.6 °C) (Temporal)   Resp 16   Wt 257 lb 9.6 oz (116.8

## 2023-06-07 ENCOUNTER — APPOINTMENT (OUTPATIENT)
Dept: CT IMAGING | Age: 23
End: 2023-06-07
Attending: EMERGENCY MEDICINE
Payer: COMMERCIAL

## 2023-06-07 ENCOUNTER — HOSPITAL ENCOUNTER (EMERGENCY)
Age: 23
Discharge: HOME | End: 2023-06-07
Attending: EMERGENCY MEDICINE
Payer: COMMERCIAL

## 2023-06-07 VITALS
TEMPERATURE: 97.8 F | RESPIRATION RATE: 18 BRPM | HEART RATE: 96 BPM | HEIGHT: 70 IN | OXYGEN SATURATION: 98 % | BODY MASS INDEX: 35.79 KG/M2 | WEIGHT: 250 LBS | SYSTOLIC BLOOD PRESSURE: 144 MMHG | DIASTOLIC BLOOD PRESSURE: 76 MMHG

## 2023-06-07 DIAGNOSIS — S09.90XA CLOSED HEAD INJURY, INITIAL ENCOUNTER: Primary | ICD-10-CM

## 2023-06-07 DIAGNOSIS — S01.01XA LACERATION OF SCALP, INITIAL ENCOUNTER: ICD-10-CM

## 2023-06-07 PROCEDURE — 90715 TDAP VACCINE 7 YRS/> IM: CPT | Performed by: EMERGENCY MEDICINE

## 2023-06-07 PROCEDURE — 6360000002 HC RX W HCPCS: Performed by: EMERGENCY MEDICINE

## 2023-06-07 PROCEDURE — 70450 CT HEAD/BRAIN W/O DYE: CPT

## 2023-06-07 PROCEDURE — 90471 IMMUNIZATION ADMIN: CPT | Performed by: EMERGENCY MEDICINE

## 2023-06-07 RX ADMIN — TETANUS TOXOID, REDUCED DIPHTHERIA TOXOID AND ACELLULAR PERTUSSIS VACCINE, ADSORBED 0.5 ML: 5; 2.5; 8; 8; 2.5 SUSPENSION INTRAMUSCULAR at 18:06

## 2023-06-07 ASSESSMENT — PAIN DESCRIPTION - PAIN TYPE
TYPE: ACUTE PAIN
TYPE: ACUTE PAIN

## 2023-06-07 ASSESSMENT — PAIN - FUNCTIONAL ASSESSMENT
PAIN_FUNCTIONAL_ASSESSMENT: 0-10
PAIN_FUNCTIONAL_ASSESSMENT: 0-10
PAIN_FUNCTIONAL_ASSESSMENT: ACTIVITIES ARE NOT PREVENTED

## 2023-06-07 ASSESSMENT — PAIN DESCRIPTION - DESCRIPTORS
DESCRIPTORS: ACHING;DISCOMFORT
DESCRIPTORS: ACHING;DISCOMFORT

## 2023-06-07 ASSESSMENT — LIFESTYLE VARIABLES
HOW OFTEN DO YOU HAVE A DRINK CONTAINING ALCOHOL: 2-4 TIMES A MONTH
HOW MANY STANDARD DRINKS CONTAINING ALCOHOL DO YOU HAVE ON A TYPICAL DAY: 1 OR 2

## 2023-06-07 ASSESSMENT — PAIN SCALES - GENERAL
PAINLEVEL_OUTOF10: 6
PAINLEVEL_OUTOF10: 4

## 2023-06-07 ASSESSMENT — PAIN DESCRIPTION - FREQUENCY
FREQUENCY: INTERMITTENT
FREQUENCY: CONTINUOUS

## 2023-06-07 ASSESSMENT — PAIN DESCRIPTION - ORIENTATION
ORIENTATION: RIGHT
ORIENTATION: RIGHT

## 2023-06-07 ASSESSMENT — PAIN DESCRIPTION - LOCATION
LOCATION: HEAD
LOCATION: HEAD
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
## 2023-08-17 ENCOUNTER — OFFICE VISIT (OUTPATIENT)
Dept: FAMILY MEDICINE CLINIC | Age: 23
End: 2023-08-17
Payer: COMMERCIAL

## 2023-08-17 VITALS
RESPIRATION RATE: 16 BRPM | BODY MASS INDEX: 36.13 KG/M2 | DIASTOLIC BLOOD PRESSURE: 84 MMHG | SYSTOLIC BLOOD PRESSURE: 116 MMHG | HEIGHT: 70 IN | OXYGEN SATURATION: 96 % | WEIGHT: 252.4 LBS | HEART RATE: 62 BPM | TEMPERATURE: 97.3 F

## 2023-08-17 DIAGNOSIS — F90.0 ATTENTION DEFICIT HYPERACTIVITY DISORDER (ADHD), PREDOMINANTLY INATTENTIVE TYPE: Primary | ICD-10-CM

## 2023-08-17 PROBLEM — F90.2 ATTENTION DEFICIT HYPERACTIVITY DISORDER (ADHD), COMBINED TYPE: Status: RESOLVED | Noted: 2022-07-21 | Resolved: 2023-08-17

## 2023-08-17 PROCEDURE — 99214 OFFICE O/P EST MOD 30 MIN: CPT | Performed by: NURSE PRACTITIONER

## 2023-08-17 RX ORDER — METHYLPHENIDATE HYDROCHLORIDE 27 MG/1
27 TABLET ORAL DAILY
Qty: 30 TABLET | Refills: 0 | Status: SHIPPED | OUTPATIENT
Start: 2023-09-16 | End: 2023-08-18 | Stop reason: SDUPTHER

## 2023-08-17 RX ORDER — METHYLPHENIDATE HYDROCHLORIDE 27 MG/1
27 TABLET ORAL DAILY
Qty: 30 TABLET | Refills: 0 | Status: SHIPPED | OUTPATIENT
Start: 2023-08-17 | End: 2023-08-18 | Stop reason: SDUPTHER

## 2023-08-17 ASSESSMENT — ENCOUNTER SYMPTOMS
WHEEZING: 0
CHEST TIGHTNESS: 0
COUGH: 0
SHORTNESS OF BREATH: 0

## 2023-08-18 ENCOUNTER — TELEPHONE (OUTPATIENT)
Dept: FAMILY MEDICINE CLINIC | Age: 23
End: 2023-08-18

## 2023-08-18 DIAGNOSIS — F90.0 ATTENTION DEFICIT HYPERACTIVITY DISORDER (ADHD), PREDOMINANTLY INATTENTIVE TYPE: ICD-10-CM

## 2023-08-18 RX ORDER — METHYLPHENIDATE HYDROCHLORIDE 27 MG/1
27 TABLET ORAL DAILY
Qty: 30 TABLET | Refills: 0 | Status: SHIPPED | OUTPATIENT
Start: 2023-09-16 | End: 2023-10-16

## 2023-08-18 RX ORDER — METHYLPHENIDATE HYDROCHLORIDE 27 MG/1
27 TABLET ORAL DAILY
Qty: 30 TABLET | Refills: 0 | Status: SHIPPED | OUTPATIENT
Start: 2023-08-18 | End: 2023-09-17

## 2023-08-18 NOTE — TELEPHONE ENCOUNTER
Received PA for [facility]. [facility] not have methylphenidate in stock but they have Concerta. PA done for [facility] [employer], insurance denied. Left voicemail for patient to call office.

## 2023-08-18 NOTE — TELEPHONE ENCOUNTER
Spoke with patient he confirmed with [facility] in [facility] [place] they do have the methylphenidate in stock.

## 2023-09-28 ENCOUNTER — OFFICE VISIT (OUTPATIENT)
Dept: FAMILY MEDICINE CLINIC | Age: 23
End: 2023-09-28
Payer: COMMERCIAL

## 2023-09-28 VITALS
HEIGHT: 70 IN | DIASTOLIC BLOOD PRESSURE: 60 MMHG | BODY MASS INDEX: 35.59 KG/M2 | SYSTOLIC BLOOD PRESSURE: 106 MMHG | OXYGEN SATURATION: 95 % | WEIGHT: 248.6 LBS | RESPIRATION RATE: 16 BRPM | HEART RATE: 57 BPM | TEMPERATURE: 97.3 F

## 2023-09-28 DIAGNOSIS — F41.1 GAD (GENERALIZED ANXIETY DISORDER): ICD-10-CM

## 2023-09-28 DIAGNOSIS — F90.0 ATTENTION DEFICIT HYPERACTIVITY DISORDER (ADHD), PREDOMINANTLY INATTENTIVE TYPE: Primary | ICD-10-CM

## 2023-09-28 PROCEDURE — 99214 OFFICE O/P EST MOD 30 MIN: CPT | Performed by: NURSE PRACTITIONER

## 2023-09-28 RX ORDER — METHYLPHENIDATE HYDROCHLORIDE 27 MG/1
27 TABLET ORAL DAILY
Qty: 30 TABLET | Refills: 0 | Status: SHIPPED | OUTPATIENT
Start: 2023-10-28 | End: 2023-11-27

## 2023-09-28 RX ORDER — METHYLPHENIDATE HYDROCHLORIDE 27 MG/1
27 TABLET ORAL DAILY
Qty: 30 TABLET | Refills: 0 | Status: SHIPPED | OUTPATIENT
Start: 2023-09-28 | End: 2023-10-28

## 2023-09-28 RX ORDER — VENLAFAXINE HYDROCHLORIDE 75 MG/1
75 CAPSULE, EXTENDED RELEASE ORAL DAILY
Qty: 30 CAPSULE | Refills: 3 | Status: SHIPPED | OUTPATIENT
Start: 2023-09-28

## 2023-09-28 RX ORDER — METHYLPHENIDATE HYDROCHLORIDE 27 MG/1
27 TABLET ORAL DAILY
Qty: 30 TABLET | Refills: 0 | Status: SHIPPED | OUTPATIENT
Start: 2023-11-27 | End: 2023-12-27

## 2023-09-28 SDOH — ECONOMIC STABILITY: FOOD INSECURITY: WITHIN THE PAST 12 MONTHS, THE FOOD YOU BOUGHT JUST DIDN'T LAST AND YOU DIDN'T HAVE MONEY TO GET MORE.: NEVER TRUE

## 2023-09-28 SDOH — ECONOMIC STABILITY: INCOME INSECURITY: HOW HARD IS IT FOR YOU TO PAY FOR THE VERY BASICS LIKE FOOD, HOUSING, MEDICAL CARE, AND HEATING?: NOT VERY HARD

## 2023-09-28 SDOH — ECONOMIC STABILITY: FOOD INSECURITY: WITHIN THE PAST 12 MONTHS, YOU WORRIED THAT YOUR FOOD WOULD RUN OUT BEFORE YOU GOT MONEY TO BUY MORE.: NEVER TRUE

## 2023-09-28 ASSESSMENT — ENCOUNTER SYMPTOMS
COUGH: 0
WHEEZING: 0
SHORTNESS OF BREATH: 0
CHEST TIGHTNESS: 0

## 2023-09-28 NOTE — PROGRESS NOTES
Preston November,  was seen with a total time spent of 30 minutes for the visit on this date of service by the E/M provider. The time component had both face to face and non face to face time spent in determining the total time component. Counseling and education regarding diagnosis listed and options regarding those diagnoses were also included in determining the time component.             AUGUSTIN Bishop NP
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
## 2023-11-09 ENCOUNTER — OFFICE VISIT (OUTPATIENT)
Dept: FAMILY MEDICINE CLINIC | Age: 23
End: 2023-11-09
Payer: COMMERCIAL

## 2023-11-09 VITALS
WEIGHT: 248.4 LBS | BODY MASS INDEX: 35.56 KG/M2 | HEIGHT: 70 IN | HEART RATE: 76 BPM | SYSTOLIC BLOOD PRESSURE: 119 MMHG | DIASTOLIC BLOOD PRESSURE: 77 MMHG | TEMPERATURE: 97.6 F | RESPIRATION RATE: 16 BRPM | OXYGEN SATURATION: 96 %

## 2023-11-09 DIAGNOSIS — R06.83 SNORING: ICD-10-CM

## 2023-11-09 DIAGNOSIS — F41.1 GAD (GENERALIZED ANXIETY DISORDER): ICD-10-CM

## 2023-11-09 DIAGNOSIS — F90.0 ATTENTION DEFICIT HYPERACTIVITY DISORDER (ADHD), PREDOMINANTLY INATTENTIVE TYPE: Primary | ICD-10-CM

## 2023-11-09 PROCEDURE — 99214 OFFICE O/P EST MOD 30 MIN: CPT | Performed by: NURSE PRACTITIONER

## 2023-11-09 RX ORDER — METHYLPHENIDATE HYDROCHLORIDE 27 MG/1
27 TABLET ORAL DAILY
Qty: 30 TABLET | Refills: 0 | Status: SHIPPED | OUTPATIENT
Start: 2023-12-27 | End: 2024-01-26

## 2023-11-09 RX ORDER — VENLAFAXINE HYDROCHLORIDE 75 MG/1
75 CAPSULE, EXTENDED RELEASE ORAL DAILY
Qty: 30 CAPSULE | Refills: 11 | Status: SHIPPED | OUTPATIENT
Start: 2023-11-09

## 2023-11-09 RX ORDER — METHYLPHENIDATE HYDROCHLORIDE 27 MG/1
27 TABLET ORAL DAILY
Qty: 30 TABLET | Refills: 0 | Status: SHIPPED | OUTPATIENT
Start: 2023-11-27 | End: 2023-12-27

## 2023-11-09 ASSESSMENT — PATIENT HEALTH QUESTIONNAIRE - PHQ9
SUM OF ALL RESPONSES TO PHQ QUESTIONS 1-9: 7
6. FEELING BAD ABOUT YOURSELF - OR THAT YOU ARE A FAILURE OR HAVE LET YOURSELF OR YOUR FAMILY DOWN: 3
7. TROUBLE CONCENTRATING ON THINGS, SUCH AS READING THE NEWSPAPER OR WATCHING TELEVISION: 0
9. THOUGHTS THAT YOU WOULD BE BETTER OFF DEAD, OR OF HURTING YOURSELF: 0
SUM OF ALL RESPONSES TO PHQ QUESTIONS 1-9: 7
5. POOR APPETITE OR OVEREATING: 0
2. FEELING DOWN, DEPRESSED OR HOPELESS: 0
SUM OF ALL RESPONSES TO PHQ QUESTIONS 1-9: 7
1. LITTLE INTEREST OR PLEASURE IN DOING THINGS: 1
3. TROUBLE FALLING OR STAYING ASLEEP: 0
SUM OF ALL RESPONSES TO PHQ QUESTIONS 1-9: 7
SUM OF ALL RESPONSES TO PHQ9 QUESTIONS 1 & 2: 1
10. IF YOU CHECKED OFF ANY PROBLEMS, HOW DIFFICULT HAVE THESE PROBLEMS MADE IT FOR YOU TO DO YOUR WORK, TAKE CARE OF THINGS AT HOME, OR GET ALONG WITH OTHER PEOPLE: 1
4. FEELING TIRED OR HAVING LITTLE ENERGY: 3
8. MOVING OR SPEAKING SO SLOWLY THAT OTHER PEOPLE COULD HAVE NOTICED. OR THE OPPOSITE, BEING SO FIGETY OR RESTLESS THAT YOU HAVE BEEN MOVING AROUND A LOT MORE THAN USUAL: 0

## 2023-11-09 ASSESSMENT — ENCOUNTER SYMPTOMS
CHEST TIGHTNESS: 0
COUGH: 0
WHEEZING: 0
SHORTNESS OF BREATH: 0

## 2023-11-09 ASSESSMENT — ANXIETY QUESTIONNAIRES
4. TROUBLE RELAXING: 1
GAD7 TOTAL SCORE: 8
IF YOU CHECKED OFF ANY PROBLEMS ON THIS QUESTIONNAIRE, HOW DIFFICULT HAVE THESE PROBLEMS MADE IT FOR YOU TO DO YOUR WORK, TAKE CARE OF THINGS AT HOME, OR GET ALONG WITH OTHER PEOPLE: VERY DIFFICULT
3. WORRYING TOO MUCH ABOUT DIFFERENT THINGS: 1
6. BECOMING EASILY ANNOYED OR IRRITABLE: 3
5. BEING SO RESTLESS THAT IT IS HARD TO SIT STILL: 1
2. NOT BEING ABLE TO STOP OR CONTROL WORRYING: 1
7. FEELING AFRAID AS IF SOMETHING AWFUL MIGHT HAPPEN: 0
1. FEELING NERVOUS, ANXIOUS, OR ON EDGE: 1

## 2023-11-09 NOTE — PROGRESS NOTES
Subjective:      Chief Complaint   Patient presents with    Follow-up     Anxiety/depression, needs a new referral to sleep clinic       HPI:  Viet Ng is a 21 y.o. male who presents today for medication follow up. He is requesting new referral to sleep medicine for sleep apnea evaluation. ADD/ADHD:  Current treatment: Concerta- 27 mg daily, which was has been effective. Patient denies any side effects. [place] reviewed, medication last filled 09/28/2023. He still has about one week of medication left; he does not take the medication on the weekends. MDD/Anxiety: He reports that symptoms are well controlled with current medication. He denies AVH/SIB/SI/HI. Past Medical History:   Diagnosis Date    ADHD (attention deficit hyperactivity disorder)     diagnosed at the age of 10    Asthma         Social History     Tobacco Use    Smoking status: Never     Passive exposure: Yes    Smokeless tobacco: Never   Substance Use Topics    Alcohol use: Yes     Comment: occassionally        Review of Systems   Constitutional:  Negative for appetite change, chills, diaphoresis, fatigue, fever and unexpected weight change. Respiratory:  Negative for cough, chest tightness, shortness of breath and wheezing. Cardiovascular:  Negative for chest pain, palpitations and leg swelling. Endocrine: Negative. Negative for cold intolerance, heat intolerance, polydipsia, polyphagia and polyuria. Skin: Negative. Negative for rash. Neurological: Negative. Negative for dizziness, tremors, seizures, syncope, facial asymmetry, speech difficulty, weakness, light-headedness, numbness and headaches. Psychiatric/Behavioral:  Negative for agitation, behavioral problems, confusion, decreased concentration, dysphoric mood, hallucinations, self-injury, sleep disturbance and suicidal ideas. The patient is not nervous/anxious and is not hyperactive.          Symptoms are stable           Objective:      /77 (Site:

## 2023-12-05 ENCOUNTER — HOSPITAL ENCOUNTER (OUTPATIENT)
Dept: SLEEP CENTER | Age: 23
Discharge: HOME | End: 2023-12-05
Payer: COMMERCIAL

## 2023-12-05 VITALS
HEIGHT: 70 IN | DIASTOLIC BLOOD PRESSURE: 54 MMHG | HEART RATE: 81 BPM | BODY MASS INDEX: 35.79 KG/M2 | WEIGHT: 250 LBS | OXYGEN SATURATION: 96 % | SYSTOLIC BLOOD PRESSURE: 116 MMHG

## 2023-12-05 DIAGNOSIS — R06.83 SNORING: ICD-10-CM

## 2023-12-05 DIAGNOSIS — G47.19 EXCESSIVE DAYTIME SLEEPINESS: ICD-10-CM

## 2023-12-05 DIAGNOSIS — G47.33 OSA (OBSTRUCTIVE SLEEP APNEA): Primary | ICD-10-CM

## 2023-12-05 PROCEDURE — 99211 OFF/OP EST MAY X REQ PHY/QHP: CPT

## 2023-12-05 PROCEDURE — 99205 OFFICE O/P NEW HI 60 MIN: CPT | Performed by: STUDENT IN AN ORGANIZED HEALTH CARE EDUCATION/TRAINING PROGRAM

## 2023-12-05 ASSESSMENT — SLEEP AND FATIGUE QUESTIONNAIRES
HOW LIKELY ARE YOU TO NOD OFF OR FALL ASLEEP WHILE SITTING AND READING: 0
HOW LIKELY ARE YOU TO NOD OFF OR FALL ASLEEP WHILE WATCHING TV: 3
HOW LIKELY ARE YOU TO NOD OFF OR FALL ASLEEP WHILE SITTING QUIETLY AFTER LUNCH WITHOUT ALCOHOL: 0
HOW LIKELY ARE YOU TO NOD OFF OR FALL ASLEEP WHILE LYING DOWN TO REST IN THE AFTERNOON WHEN CIRCUMSTANCES PERMIT: 3
HOW LIKELY ARE YOU TO NOD OFF OR FALL ASLEEP IN A CAR, WHILE STOPPED FOR A FEW MINUTES IN TRAFFIC: 1
HOW LIKELY ARE YOU TO NOD OFF OR FALL ASLEEP WHILE SITTING AND TALKING TO SOMEONE: 1
HOW LIKELY ARE YOU TO NOD OFF OR FALL ASLEEP WHILE SITTING INACTIVE IN A PUBLIC PLACE: 0
ESS TOTAL SCORE: 11
HOW LIKELY ARE YOU TO NOD OFF OR FALL ASLEEP WHEN YOU ARE A PASSENGER IN A CAR FOR AN HOUR WITHOUT A BREAK: 3

## 2023-12-05 NOTE — CONSULTS
[address] [facility] MD, Yesenia Boswell MD, [address] [facility] MD, [address] [place] [address] Avenue DO       W. Panchito Cabral. [address] [facility], [address] [facility]   [address] [place]: [phone]  F: [phone]     Subjective:     Patient ID: Asael Boyd is a 21 y.o. male, referred to the sleep center for   Chief Complaint   Patient presents with    Snoring   . Referring Provider:    AUGUSTIN Fernández NP  [address] [facility],  [address]    Summary: His now-ex girlfriend told him the past year he stops breathing in his sleep and snores loudly.      Symptoms:   [x]  Snoring      [x]  Dry Mouth  []  Choking                                                                []  Morning Headaches  []  Gasping for Air                                                     []  Trouble Falling asleep  [x]  Tired during the daytime                                      []  Trouble Staying Asleep  [x]  Tired when you wake up                                      []  Weight Gain in Last 5 Years  [x]  Wake up frequently at night                                 [x]  Weight Loss in Last 5 Years  [x]  Shortness Of Breath                                           []  Shift Worker  []  Coughing                                                             [x]  Smoker (Current)  [x]  Chest Pain                                                         [x]  Anxiety  []  Trouble keeping your legs still at night               [x]  Depression  []  Kicking your legs in your sleep                           []  Insomnia            []  Other:     Significant Co-morbidities:  []  Congestive Heart Failure     []  COPD         []  Stroke (Past 30 Days)      []  Supplemental Oxygen Usage       []  Cognitive Impairment      []  Neuromuscular Problems  []  Epilepsy/Neurological Disorders         Duration of Sleep Problems: 1

## 2024-01-07 ENCOUNTER — HOSPITAL ENCOUNTER (OUTPATIENT)
Dept: SLEEP CENTER | Age: 24
Discharge: HOME | End: 2024-01-07
Payer: COMMERCIAL

## 2024-01-07 VITALS — BODY MASS INDEX: 35.79 KG/M2 | WEIGHT: 250 LBS | HEIGHT: 70 IN

## 2024-01-07 DIAGNOSIS — R06.83 SNORING: ICD-10-CM

## 2024-01-07 DIAGNOSIS — G47.19 EXCESSIVE DAYTIME SLEEPINESS: ICD-10-CM

## 2024-01-07 DIAGNOSIS — G47.33 OSA (OBSTRUCTIVE SLEEP APNEA): ICD-10-CM

## 2024-01-07 PROCEDURE — 95811 POLYSOM 6/>YRS CPAP 4/> PARM: CPT

## 2024-01-08 NOTE — PROGRESS NOTES
1/8/2024  sleep study  for Venkatesh Juares  2000 is complete.      Results are pending physician review.    Electronically signed by Gavino Hernandez on 1/8/2024 at 5:58 AM

## 2024-04-08 NOTE — PROGRESS NOTES
[facility]      Luis Muñoz MD, FACP, [facility]  Pravin Joyce MD, [facility]  Nahum Gil MD, [facility]  Yudith Sheffield DO  Erika Chacon DO      [address]  [address]  [place] [address]   PH: [phone]  F: [phone]     Subjective:     Patient ID: Venkatesh Juares is a 23 y.o. male, following up today with the sleep center.     Reason for Follow Up/Chief Complaint: No chief complaint on file.      Results:    History: Mr. Juares was initially referred for snoring and witnessed apneic events in  sleep.     He underwent PSG for suspected HILARY on 1/7/2024 demonstrating moderate HILARY, significantly improved with CPAP titration to 6 cm of water.     Since starting CPAP he reports***.  He is using***mask.  No other changes in health since last visit.     Social History     Socioeconomic History    Marital status: Single     Spouse name: Not on file    Number of children: Not on file    Years of education: Not on file    Highest education level: High school graduate   Occupational History     Comment: [employer]   Tobacco Use    Smoking status: Never     Passive exposure: Yes    Smokeless tobacco: Never   Substance and Sexual Activity    Alcohol use: Yes     Comment: occassionally    Drug use: Yes     Types: Marijuana (Weed)     Comment: occassionally    Sexual activity: Yes     Partners: Female   Other Topics Concern    Not on file   Social History Narrative    Not on file     Social Determinants of Health     Financial Resource Strain: Low Risk  (9/28/2023)    Overall Financial Resource Strain (CARDIA)     Difficulty of Paying Living Expenses: Not very hard   Food Insecurity: Not on file (9/28/2023)   Transportation Needs: Unknown (9/28/2023)    PRAPARE - Transportation     Lack of Transportation (Medical): Not on file     Lack of Transportation (Non-Medical): No   Physical Activity: Sufficiently Active (7/21/2022)    Exercise Vital Sign     Days of Exercise per Week: 4 days

## 2024-04-09 ENCOUNTER — HOSPITAL ENCOUNTER (OUTPATIENT)
Dept: SLEEP CENTER | Age: 24
Discharge: HOME | End: 2024-04-09